# Patient Record
Sex: FEMALE | Race: WHITE | NOT HISPANIC OR LATINO | Employment: OTHER | URBAN - NONMETROPOLITAN AREA
[De-identification: names, ages, dates, MRNs, and addresses within clinical notes are randomized per-mention and may not be internally consistent; named-entity substitution may affect disease eponyms.]

---

## 2017-09-12 ENCOUNTER — OFFICE VISIT (OUTPATIENT)
Dept: ORTHOPEDIC SURGERY | Facility: CLINIC | Age: 62
End: 2017-09-12

## 2017-09-12 VITALS — BODY MASS INDEX: 23.49 KG/M2 | WEIGHT: 155 LBS | HEIGHT: 68 IN

## 2017-09-12 DIAGNOSIS — M17.11 PRIMARY OSTEOARTHRITIS OF RIGHT KNEE: ICD-10-CM

## 2017-09-12 DIAGNOSIS — M25.561 RIGHT KNEE PAIN, UNSPECIFIED CHRONICITY: Primary | ICD-10-CM

## 2017-09-12 DIAGNOSIS — Z96.652 STATUS POST TOTAL KNEE REPLACEMENT, LEFT: ICD-10-CM

## 2017-09-12 PROCEDURE — 73560 X-RAY EXAM OF KNEE 1 OR 2: CPT | Performed by: ORTHOPAEDIC SURGERY

## 2017-09-12 PROCEDURE — 99204 OFFICE O/P NEW MOD 45 MIN: CPT | Performed by: ORTHOPAEDIC SURGERY

## 2017-09-12 RX ORDER — MELOXICAM 7.5 MG/1
7.5 TABLET ORAL DAILY
COMMUNITY

## 2017-09-12 RX ORDER — ESTRADIOL 1 MG/1
1 TABLET ORAL DAILY
COMMUNITY
End: 2023-02-24

## 2017-09-12 RX ORDER — GABAPENTIN 100 MG/1
100 CAPSULE ORAL 3 TIMES DAILY
COMMUNITY

## 2017-09-12 RX ORDER — LOVASTATIN 20 MG/1
20 TABLET ORAL NIGHTLY
COMMUNITY

## 2017-09-12 NOTE — PROGRESS NOTES
Chief Complaint   Patient presents with   • Right Knee - Pain   Patient is here today with right knee pain. She has had the left knee replaced and revised back in 2015.        HPI patient is here for second opinion.  She has progressively increasing pain and discomfort on the lateral aspect the right knee.  Duration of complaints is about 4 years.  She is progressively losing mobility.  She also feels like her valgus deformities progressively worse.  For the past 2 months her symptoms have been increasingly intense.  She describes her pain as a dull aching pain on the lateral aspect of the knee.  She has difficulty going up and down the steps.  Intermittently she will get a sharp stabbing pain as well.  There is associated clicking popping and grinding on the lateral aspect of the knee as well.  There is also a tendency for her patella to ride laterally.  Unfortunately the patient had a total knee arthroplasty on the opposite side performed by Dr. Roland Andrews in [facility] in June 2015.  This knee replacement did not do well for her right from the get go.  She continued to have pain and discomfort and swelling.  She then underwent a revision surgery which is a Travesty for a relatively young patient to have had within 1 year off her index surgery.  This surgery was performed by Dr. Akbar Ernandez in [facility].  She still has less than perfect function on the left side.  She is quite content to except there is some optimal outcome with her left knee arthroplasty at this point.  She is dead set against more surgeries of the left side and I certainly agree with that.  The right knee is moderately symptomatic and in light of the fact that she had a less than perfect outcome on the opposite side both the patient and I want to wait for as long as possible before offering her surgical replacement of the right knee.  He works as an  in a school system and [place] and Wants to Be As Active  As Possible to Improve Her General Health Including Improved Cardiovascular Status.  The Knee Pain Does Inhibit Her Mobility.  She Also Notes That the Valgus Deformity Is Somewhat Progressive and She Is Becoming More Knock Kneed Than before As the Arthritis Progresses.          Allergies not on file      Social History     Social History   • Marital status:      Spouse name: N/A   • Number of children: N/A   • Years of education: N/A     Occupational History   • Not on file.     Social History Main Topics   • Smoking status: Not on file   • Smokeless tobacco: Not on file   • Alcohol use Not on file   • Drug use: Not on file   • Sexual activity: Not on file     Other Topics Concern   • Not on file     Social History Narrative       No family history on file.    No past surgical history on file.    No past medical history on file.        There were no vitals filed for this visit.          Review of Systems   Constitutional: Negative.    HENT: Negative.    Eyes: Negative.    Respiratory: Negative.    Cardiovascular: Negative.    Gastrointestinal: Negative.    Endocrine: Negative.    Genitourinary: Negative.    Musculoskeletal: Negative.    Skin: Negative.    Allergic/Immunologic: Negative.    Neurological: Negative.    Hematological: Negative.    Psychiatric/Behavioral: Negative.            Physical Exam   Constitutional: She is oriented to person, place, and time. She appears well-nourished.   HENT:   Head: Atraumatic.   Eyes: EOM are normal.   Neck: Neck supple.   Cardiovascular: Normal rate and intact distal pulses.    Pulmonary/Chest: Breath sounds normal.   Abdominal: Bowel sounds are normal.   Musculoskeletal: She exhibits edema and tenderness.   Neurological: She is alert and oriented to person, place, and time. She has normal reflexes.   Skin: Skin is dry.   Psychiatric: She has a normal mood and affect. Her behavior is normal. Judgment and thought content normal.   Nursing note and vitals  reviewed.              Joint/Body Part Specific Exam:  right knee. Positive grind test. Pain and tenderness is present over the lateral aspect of the knee. Patient has some tenderness and irritability of the common peroneal nerve. Lateral joint line is exquisitely painful and tender. Patella is tending to track a little bit laterally. There is thickening of the synovial membrane. Range of motion in flexion is 0-120° degrees. Dorsalis pedis and posterior tibial artery pulses are palpable. Common peroneal nerve function is well preserved. Gait is cautious and antalgic. The patient has valgus orientation of the knee with a high degree of external rotation than the contralateral side.    left knee.The patient is status post total knee arthroplasty postoperative 2 year(s). Incision is clean. Calf is soft and nontender. Homans sign is negative. There is no clicking, popping or catching. Anterior and posterior drawer signs are negative.  There is no instability of the components. Appropriate amounts of swelling and bruising are noted. Dorsalis pedis and posterior tibial artery pulses are palpable. Common peroneal nerve function is well preserved. Range of motion is from 0- 110 degrees of flexion. Gait is cautious but otherwise fairly normal. There is no evidence of a deep seated joint infection.    X-ray report:  right Knee X-Ray  Indication: Pain with progressive valgus deformity  AP, Lateral views  Findings: Moderately advanced degenerative osteoarthritis of the right knee  no bony lesion  Soft tissues within normal limits  decreased joint spaces  Hardware appropriately positioned not applicable      no prior studies available for comparison.    X-RAY was ordered and reviewed by Keon Luna MD  Diagnostics:            Rosaura was seen today for pain.    Diagnoses and all orders for this visit:    Right knee pain, unspecified chronicity  -     XR Knee 1 or 2 View Right            Procedures          I provided this  patient with educational materials regarding bone health and cast or splint care.        Plan:   Extensive amount of time spent with the patient and her family today in the office discussing different treatment options.    At this point I would recommend conservative nonoperative care of the right knee.    Injection Synvisc for Visco supplementation or injections steroid intra-articularly for helping with pain and inflammation discussed and offered to the patient.    Use a supportive,  brace discussed with the patient.    Calcium and vitamin D for bone health.    Glucosamine and chondroitin for cartilage health.    Tablet ibuprofen 600 mg orally daily at bedtime.  Pain and discomfort.    Home-based exercise program with stretching sensing exercises of the quads and the hamstrings.    6 monthly follow-ups to see the progression of the valgus degenerative osteoarthritis of the knee.    At this point she does not qualify to be a candidate for total knee arthroplasty because of a lack of her symptoms although she does have quite a significant amount of radiological disease with a valgus deformity.  In light of the fact that she is already had to have a revision on the left knee arthroplasty I would recommend conservative, nonoperative care should be absolutely a last resort for this patient.    Time spent in the office with the patient and her friend discussing different treatment options and reviewing the notes from the previous doctors is 45 minutes.    CC To ISRRAEL Muñoz

## 2017-09-19 PROBLEM — M25.561 RIGHT KNEE PAIN: Status: ACTIVE | Noted: 2017-09-19

## 2017-09-19 PROBLEM — M17.11 PRIMARY OSTEOARTHRITIS OF RIGHT KNEE: Status: ACTIVE | Noted: 2017-09-19

## 2017-09-19 PROBLEM — Z96.652 STATUS POST TOTAL KNEE REPLACEMENT, LEFT: Status: ACTIVE | Noted: 2017-09-19

## 2018-03-13 ENCOUNTER — OFFICE VISIT (OUTPATIENT)
Dept: ORTHOPEDIC SURGERY | Facility: CLINIC | Age: 63
End: 2018-03-13

## 2018-03-13 DIAGNOSIS — M17.11 PRIMARY OSTEOARTHRITIS OF RIGHT KNEE: Primary | ICD-10-CM

## 2018-03-13 DIAGNOSIS — Z96.652 STATUS POST TOTAL KNEE REPLACEMENT, LEFT: ICD-10-CM

## 2018-03-13 PROCEDURE — 99213 OFFICE O/P EST LOW 20 MIN: CPT | Performed by: ORTHOPAEDIC SURGERY

## 2018-03-13 NOTE — PROGRESS NOTES
Chief Complaint   Patient presents with   • Right Knee - Follow-up           HPI  Patient is here for a follow up of her right knee. She has medial sided pain and discomfort.  There is some difficulty in going up and down the steps.  The patient finds it difficult to squat on the ground because of the medial pain and discomfort.  She states that she would like to defer surgical intervention in the form of a knee arthroplasty for as long as possible because she had a relatively bad outcome with her left knee replacement.  Initially she had to have a knee arthroscopy performed by Dr. Noel in 2015.  This was followed by a total knee replacement performed by Dr. Cevallos.  This led to an unsatisfactory outcome and she has already had a revision performed with Dr. Ernandez.  She states that the unpleasant experience is still fresh and undermined and she would like to avoid surgery as long as possible.  I certainly agree with that approach.        There were no vitals filed for this visit.        Review of Systems   Constitutional: Negative.    HENT: Negative.    Eyes: Negative.    Respiratory: Negative.    Cardiovascular: Negative.    Gastrointestinal: Negative.    Endocrine: Negative.    Genitourinary: Negative.    Musculoskeletal: Positive for gait problem and joint swelling.   Skin: Negative.    Allergic/Immunologic: Negative.    Hematological: Negative.    Psychiatric/Behavioral: Negative.            Physical Exam   Constitutional: She is oriented to person, place, and time. She appears well-nourished.   HENT:   Head: Atraumatic.   Eyes: EOM are normal.   Neck: Neck supple.   Cardiovascular: Normal rate, regular rhythm, normal heart sounds and intact distal pulses.    Pulmonary/Chest: Effort normal and breath sounds normal.   Abdominal: Bowel sounds are normal.   Musculoskeletal: She exhibits edema and tenderness.   Neurological: She is alert and oriented to person, place, and time. She has normal reflexes.   Skin: Skin  is dry. Capillary refill takes 2 to 3 seconds.   Psychiatric: She has a normal mood and affect. Her behavior is normal. Judgment and thought content normal.   Nursing note and vitals reviewed.          Joint/Body Part Specific Exam:  right knee. Patient has crepitus throughout range of motion. Positive patellar grind test. Mild effusion. Lachman is negative. Pivot shift is negative. Anterior and posterior drawer signs are negative. Significant joint line tenderness is noted on the medial aspect of the knee. Patient has a varus orientation of the knee. There is fullness and tenderness in the Popliteal fossa. Mild distention of a Popliteal cyst is noted in this location. Range of motion in flexion is from 0- 110° degrees. Neurovascular status is intact.  Dorsalis pedis and posterior tibial artery pulses are palpable. Common peroneal nerve function is well preserved. Patient's gait is cautious and antalgic. Skin and soft tissues are mildly swollen, consistent with synovitis and effusion. The patient has a significant limp with the first few steps after starting the gait cycle. Getting out of a chair takes a lot of effort due to pain on knee flexion.    left knee.The patient is status post total knee arthroplasty postoperative 3 year(s). Incision is clean. Calf is soft and nontender. Homans sign is negative. There is no clicking, popping or catching. Anterior and posterior drawer signs are negative.  There is no instability of the components. Appropriate amounts of swelling and bruising are noted. Dorsalis pedis and posterior tibial artery pulses are palpable. Common peroneal nerve function is well preserved. Range of motion is from 10- 100 degrees of flexion. Gait is cautious but otherwise fairly normal. There is no evidence of a deep seated joint infection.  X-RAY Report:  Prior x-rays obtained in this office in September 2017 show some narrowing of the medial joint space with osteophyte  formation.      Diagnostics:        Rosaura was seen today for follow-up.    Diagnoses and all orders for this visit:    Primary osteoarthritis of right knee    Status post total knee replacement, left            Procedures        Plan:  Gentle active mobilization of both knees with stretching and strengthening exercises of the quads and the hamstrings.    Tablet ibuprofen 600 mg orally twice a day for pain swelling and discomfort.    Calcium and vitamin D for bone health.    Use a supportive brace on the knee to prevent it from buckling and giving out.    Falls precautions.    Intra-articular steroid injection and Synvisc Visco supplementation discussed with the patient at length.    Eventually she will need a total knee arthroplasty of the right knee as well.    , GI and dental procedure prophylaxis with antibiotics to prevent a metastatic infection to the knee arthroplasty implants.    Follow-up in my office in 1 year.  The patient can come in sooner if she feels like her symptoms are getting worse and she requires intervention either in the form of an injection or consideration for surgery.

## 2018-10-30 ENCOUNTER — OFFICE VISIT CONVERTED (OUTPATIENT)
Dept: ORTHOPEDIC SURGERY | Facility: CLINIC | Age: 63
End: 2018-10-30
Attending: ORTHOPAEDIC SURGERY

## 2018-12-11 ENCOUNTER — OFFICE VISIT CONVERTED (OUTPATIENT)
Dept: ORTHOPEDIC SURGERY | Facility: CLINIC | Age: 63
End: 2018-12-11
Attending: ORTHOPAEDIC SURGERY

## 2018-12-11 ENCOUNTER — CONVERSION ENCOUNTER (OUTPATIENT)
Dept: ORTHOPEDIC SURGERY | Facility: CLINIC | Age: 63
End: 2018-12-11

## 2019-02-05 ENCOUNTER — OFFICE VISIT CONVERTED (OUTPATIENT)
Dept: ORTHOPEDIC SURGERY | Facility: CLINIC | Age: 64
End: 2019-02-05
Attending: PHYSICIAN ASSISTANT

## 2019-04-04 ENCOUNTER — OFFICE VISIT CONVERTED (OUTPATIENT)
Dept: ORTHOPEDIC SURGERY | Facility: CLINIC | Age: 64
End: 2019-04-04
Attending: PHYSICIAN ASSISTANT

## 2019-05-16 ENCOUNTER — OFFICE VISIT CONVERTED (OUTPATIENT)
Dept: ORTHOPEDIC SURGERY | Facility: CLINIC | Age: 64
End: 2019-05-16
Attending: PHYSICIAN ASSISTANT

## 2019-06-04 ENCOUNTER — OFFICE VISIT CONVERTED (OUTPATIENT)
Dept: ORTHOPEDIC SURGERY | Facility: CLINIC | Age: 64
End: 2019-06-04
Attending: PHYSICIAN ASSISTANT

## 2019-06-19 ENCOUNTER — OFFICE VISIT CONVERTED (OUTPATIENT)
Dept: GASTROENTEROLOGY | Facility: CLINIC | Age: 64
End: 2019-06-19
Attending: INTERNAL MEDICINE

## 2019-07-09 ENCOUNTER — OFFICE VISIT CONVERTED (OUTPATIENT)
Dept: ORTHOPEDIC SURGERY | Facility: CLINIC | Age: 64
End: 2019-07-09
Attending: PHYSICIAN ASSISTANT

## 2019-07-09 ENCOUNTER — CONVERSION ENCOUNTER (OUTPATIENT)
Dept: ORTHOPEDIC SURGERY | Facility: CLINIC | Age: 64
End: 2019-07-09

## 2019-07-11 ENCOUNTER — HOSPITAL ENCOUNTER (OUTPATIENT)
Dept: GASTROENTEROLOGY | Facility: HOSPITAL | Age: 64
Setting detail: HOSPITAL OUTPATIENT SURGERY
Discharge: HOME | End: 2019-07-11
Attending: INTERNAL MEDICINE

## 2019-07-11 LAB — GLUCOSE BLD-MCNC: 106 MG/DL (ref 65–99)

## 2019-07-25 ENCOUNTER — OFFICE VISIT CONVERTED (OUTPATIENT)
Dept: ORTHOPEDIC SURGERY | Facility: CLINIC | Age: 64
End: 2019-07-25
Attending: PHYSICIAN ASSISTANT

## 2019-07-25 ENCOUNTER — CONVERSION ENCOUNTER (OUTPATIENT)
Dept: ORTHOPEDIC SURGERY | Facility: CLINIC | Age: 64
End: 2019-07-25

## 2019-09-05 ENCOUNTER — OFFICE VISIT CONVERTED (OUTPATIENT)
Dept: ORTHOPEDIC SURGERY | Facility: CLINIC | Age: 64
End: 2019-09-05
Attending: ORTHOPAEDIC SURGERY

## 2019-11-06 ENCOUNTER — HOSPITAL ENCOUNTER (OUTPATIENT)
Dept: PERIOP | Facility: HOSPITAL | Age: 64
Setting detail: HOSPITAL OUTPATIENT SURGERY
Discharge: HOME | End: 2019-11-06
Attending: ORTHOPAEDIC SURGERY

## 2019-11-06 LAB
ANION GAP SERPL CALC-SCNC: 17 MMOL/L (ref 8–19)
BUN SERPL-MCNC: 12 MG/DL (ref 5–25)
BUN/CREAT SERPL: 15 {RATIO} (ref 6–20)
CALCIUM SERPL-MCNC: 9.6 MG/DL (ref 8.7–10.4)
CHLORIDE SERPL-SCNC: 104 MMOL/L (ref 99–111)
CONV CO2: 24 MMOL/L (ref 22–32)
CREAT UR-MCNC: 0.8 MG/DL (ref 0.5–0.9)
GFR SERPLBLD BASED ON 1.73 SQ M-ARVRAT: >60 ML/MIN/{1.73_M2}
GLUCOSE SERPL-MCNC: 99 MG/DL (ref 65–99)
OSMOLALITY SERPL CALC.SUM OF ELEC: 292 MOSM/KG (ref 273–304)
POTASSIUM SERPL-SCNC: 4.1 MMOL/L (ref 3.5–5.3)
SODIUM SERPL-SCNC: 141 MMOL/L (ref 135–147)

## 2019-11-21 ENCOUNTER — OFFICE VISIT CONVERTED (OUTPATIENT)
Dept: ORTHOPEDIC SURGERY | Facility: CLINIC | Age: 64
End: 2019-11-21
Attending: PHYSICIAN ASSISTANT

## 2019-12-19 ENCOUNTER — OFFICE VISIT CONVERTED (OUTPATIENT)
Dept: ORTHOPEDIC SURGERY | Facility: CLINIC | Age: 64
End: 2019-12-19
Attending: PHYSICIAN ASSISTANT

## 2020-01-30 ENCOUNTER — OFFICE VISIT CONVERTED (OUTPATIENT)
Dept: ORTHOPEDIC SURGERY | Facility: CLINIC | Age: 65
End: 2020-01-30
Attending: ORTHOPAEDIC SURGERY

## 2020-04-23 ENCOUNTER — OFFICE VISIT CONVERTED (OUTPATIENT)
Dept: ORTHOPEDIC SURGERY | Facility: CLINIC | Age: 65
End: 2020-04-23
Attending: PHYSICIAN ASSISTANT

## 2020-05-28 ENCOUNTER — OFFICE VISIT CONVERTED (OUTPATIENT)
Dept: ORTHOPEDIC SURGERY | Facility: CLINIC | Age: 65
End: 2020-05-28
Attending: PHYSICIAN ASSISTANT

## 2020-07-14 ENCOUNTER — OFFICE VISIT CONVERTED (OUTPATIENT)
Dept: ORTHOPEDIC SURGERY | Facility: CLINIC | Age: 65
End: 2020-07-14
Attending: PHYSICIAN ASSISTANT

## 2021-04-06 ENCOUNTER — OFFICE VISIT CONVERTED (OUTPATIENT)
Dept: GASTROENTEROLOGY | Facility: CLINIC | Age: 66
End: 2021-04-06
Attending: NURSE PRACTITIONER

## 2021-05-12 NOTE — PROGRESS NOTES
Progress Note      Patient Name: Rosaura Perry   Patient ID: [id number]   Sex: Female   YOB: 1955    Primary Care Provider: Roma ALVARADO   Referring Provider: Hanh Nazario MD    Visit Date: April 23, 2020    Provider: Hawa Meza PA-C   Location: [facility]   Location Address: [address] [place] [address]  [id number]   Location Phone: [phone]          Chief Complaint  · Left Shoulder Pain      History Of Present Illness  Rosaura Perry is a 64 year old /White female who presents today to [facility].      She is here for left shoulder pain. She had been doing well since her left arthroscopic SAD/DC/RCR 11/6/19 by Dr. Nazario until 3 weeks ago. She states deep pain that feels like prior to her surgery. She denies re-injury. She states lack of ROM.       Past Medical History  Arthritis; Bowel Disease; Cervicalgia; Diabetes; Diverticulitis; Diverticulitis; Essential hypertension; Headache; High cholesterol; Hyperlipemia; Hypertension; Hypertension, essential; Irritable bowel syndrome; Limb Swelling; Neck pain; Seasonal allergies; Sprain And Strain         Past Surgical History  Appendectomy; Bunion surgery; Colonoscopy; EGD; Hysterectomy; Joint Surgery; Knee surgery; Metal implants; Shoulder surgery         Medication List  estradiol 0.5 mg oral tablet; estradiol 1 mg Oral Tablet; Flexeril 5 mg Oral tablet; Jardiance 10 mg oral tablet; labetalol 100 mg Oral Tablet; losartan 25 mg oral tablet; lovastatin 20 mg oral tablet; Medrol (Caden) 4 mg oral tablets,dose pack; meloxicam 7.5 mg oral tablet; metoprolol tartrate 50 mg oral tablet; Neurontin 100 mg Oral capsule; Norco 7.5-325 mg oral tablet; pravastatin 20 mg Oral tablet; Suprep Bowel Prep Kit 17.5-3.13-1.6 gram oral recon soln; Viberzi 75 mg oral tablet         Allergy List  NO KNOWN DRUG ALLERGIES         Family Medical History  Diabetes, unspecified type; Diabetes, unspecified; - No Family History of  "Colorectal Cancer; -None; Family history of certain chronic disabling diseases; arthritis; Osteoporosis         Social History  Alcohol (Never); Alcohol Use (Never); lives with spouse; ; .; Recreational Drug Use (Never); Retired; Tobacco (Never); Working         Review of Systems  · Constitutional  o Denies  o : fever, chills, weight loss  · Cardiovascular  o Denies  o : chest pain, shortness of breath  · Gastrointestinal  o Denies  o : liver disease, heartburn, nausea, blood in stools  · Genitourinary  o Denies  o : painful urination, blood in urine  · Integument  o Denies  o : rash, itching  · Neurologic  o Denies  o : headache, weakness, loss of consciousness  · Musculoskeletal  o Admits  o : painful, swollen joints  · Psychiatric  o Denies  o : drug/alcohol addiction, anxiety, depression      Vitals  Date Time BP Position Site L\R Cuff Size HR RR TEMP (F) WT  HT  BMI kg/m2 BSA m2 O2 Sat        04/23/2020 10:35 AM      58 - R   147lbs 16oz 5'  8\" 22.5 1.79 99 %          Physical Examination  · Constitutional  o Appearance  o : well developed, well-nourished, no obvious deformities present  · Head and Face  o Head  o :   § Inspection  § : normocephalic  o Face  o :   § Inspection  § : no facial lesions  · Eyes  o Conjunctivae  o : conjunctivae normal  o Sclerae  o : sclerae white  · Ears, Nose, Mouth and Throat  o Ears  o :   § External Ears  § : appearance within normal limits  § Hearing  § : intact  o Nose  o :   § External Nose  § : appearance normal  · Neck  o Inspection/Palpation  o : normal appearance  o Range of Motion  o : full range of motion  · Respiratory  o Respiratory Effort  o : breathing unlabored  o Inspection of Chest  o : normal appearance  o Auscultation of Lungs  o : no audible wheezing or rales  · Cardiovascular  o Heart  o : regular rate  · Gastrointestinal  o Abdominal Examination  o : soft and non-tender  · Skin and Subcutaneous Tissue  o General Inspection  o : intact, no " rashes  · Psychiatric  o General  o : Alert and oriented x3  o Judgement and Insight  o : judgment and insight intact  o Mood and Affect  o : mood normal, affect appropriate  · Left Shoulder  o Inspection  o : Well healed incisions. Near full PROM. Pain with AROM. Strength decreased. Neurovascularly intact.   · Injection Note/Aspiration Note  o Site  o : IM  o Procedure  o : Procedure: After educating the patient, patient gave consent for the procedure. After using alcohol prep, injection was given. The patient tolerated the procedure well.   o Medication  o : 2ml's of 4 mg Dexamethasone   · In Office Procedures  o View  o : AP/LATERAL  o Site  o : left, shoulder   o Indication  o : Left shoulder pain   o Study  o : X-rays ordered, taken in the office, and reviewed today.  o Xray  o : No acute bony abnormality.  o Comparative Data  o : Comparative Data found and reviewed today               Assessment  · Left shoulder pain, unspecified chronicity     719.41/M25.512  · History of repair of left rotator cuff     V15.29/Z98.890      Plan  · Orders  o 2.00 - Dexamethasone Injection 8mg (-1) - - 04/24/2020   Lot [id number] Exp 02 2021 [facility] by FORREST Christianson RT R  o IM/SQ - Injection Fee [facility] ([id number]) - - 04/24/2020  o Scapula (Left) [facility] Preferred View ([id number]) - 719.41/M25.512 - 04/23/2020  · Medications  o Medications have been Reconciled  o Transition of Care or Provider Policy  · Instructions  o Reviewed the patient's Past Medical, Social, and Family history as well as the ROS at today's visit, no changes.  o Call or return if worsening symptoms.  o IM injection. Follow up 4 weeks.   o Electronically Identified Patient Education Materials Provided Electronically            Electronically Signed by: LA Shah-C -Author on April 24, 2020 01:16:45 PM  Electronically Co-signed by: Hanh Nazario MD -Reviewer on April 28, 2020 10:49:21 AM

## 2021-05-13 NOTE — PROGRESS NOTES
"   Progress Note      Patient Name: Rosaura Perry   Patient ID: [id number]   Sex: Female   YOB: 1955    Primary Care Provider: Roma ALVARADO    Visit Date: July 14, 2020    Provider: Hawa Meza PA-C   Location: [facility]   Location Address: [address] [place] [address]  [id number]   Location Phone: [phone]          Chief Complaint  · Follow up left shoulder pain      History Of Present Illness  Rosaura Perry is a 64 year old /White female who presents today to [facility].      She is here for follow up for left shoulder pain. She is s/p left arthroscopic SAD/DC/RCR 11/6/19 by Dr. Nazario. She states IM injection gave short lived relief. She is back to baseline. She states pain lying on her left side and difficulty with ROM. She had MRI of left shoulder, which ruled out recurrent tear.             Review of Systems  · Constitutional  o Denies  o : fever, chills, weight loss  · Cardiovascular  o Denies  o : chest pain, shortness of breath  · Gastrointestinal  o Denies  o : liver disease, heartburn, nausea, blood in stools  · Genitourinary  o Denies  o : painful urination, blood in urine  · Integument  o Denies  o : rash, itching  · Neurologic  o Denies  o : headache, weakness, loss of consciousness  · Musculoskeletal  o Denies  o : painful, swollen joints  · Psychiatric  o Denies  o : drug/alcohol addiction, anxiety, depression      Vitals  Date Time BP Position Site L\R Cuff Size HR RR TEMP (F) WT  HT  BMI kg/m2 BSA m2 O2 Sat        07/14/2020 03:37 PM      60 - R   146lbs 16oz 5'  8\" 22.35 1.79 97 %          Physical Examination  · Constitutional  o Appearance  o : well developed, well-nourished, no obvious deformities present  · Head and Face  o Head  o :   § Inspection  § : normocephalic  o Face  o :   § Inspection  § : no facial lesions  · Eyes  o Conjunctivae  o : conjunctivae normal  o Sclerae  o : sclerae white  · Ears, Nose, Mouth and " Throat  o Ears  o :   § External Ears  § : appearance within normal limits  § Hearing  § : intact  o Nose  o :   § External Nose  § : appearance normal  · Neck  o Inspection/Palpation  o : normal appearance  o Range of Motion  o : full range of motion  · Respiratory  o Respiratory Effort  o : breathing unlabored  o Inspection of Chest  o : normal appearance  o Auscultation of Lungs  o : no audible wheezing or rales  · Cardiovascular  o Heart  o : regular rate  · Gastrointestinal  o Abdominal Examination  o : soft and non-tender  · Skin and Subcutaneous Tissue  o General Inspection  o : intact, no rashes  · Psychiatric  o General  o : Alert and oriented x3  o Judgement and Insight  o : judgment and insight intact  o Mood and Affect  o : mood normal, affect appropriate  · Left Shoulder  o Inspection  o : Well healed incisions. Full PROM. Pain at extremes. Decreased strength. Neurovascularly intact.   · Imaging  o Imaging  o : MRI 7/6/20 of left shoulder: IMPRESSION: No convincing evidence of recurrent rotator cuff tear. Attenuation of the supraspinatus tendon with edematous changes compatible with acute on chronic tendinosis and presents sequela of previous cuff tear and repair. Moderate diffuse infraspinatus tendinosis with high signal within the substance of the tendon may represent a delaminating tear but no communication to the articular bursal surface this may just reflect severe tendinosis. AC joint arthropathy. Os acromiale with some degeneration across the synchondrosis. Mild subacromial subdeltoid bursal inflammation. Mild biceps tendinosis.          Assessment  · Pain: Shoulder     719.41/M25.519  · History of repair of left rotator cuff     V15.29/Z98.890      Plan  · Medications  o Medications have been Reconciled  o Transition of Care or Provider Policy  · Instructions  o Reviewed the patient's Past Medical, Social, and Family history as well as the ROS at today's visit, no changes.  o Call or return if  worsening symptoms.  o She declines injection. We will try Medrol dosepack and continued HEP. Follow up PRN. May consider decadron injection.   o Electronically Identified Patient Education Materials Provided Electronically            Electronically Signed by: Hawa Meza PA-C -Author on July 14, 2020 04:06:27 PM

## 2021-05-13 NOTE — PROGRESS NOTES
Progress Note      Patient Name: Rosaura Perry   Patient ID: [id number]   Sex: Female   YOB: 1955    Primary Care Provider: Roma ALVARADO    Visit Date: May 28, 2020    Provider: Hawa Meza PA-C   Location: [facility]   Location Address: [address] [place] [address]  [id number]   Location Phone: [phone]          Chief Complaint  · Follow up left shoulder arthroscopy      History Of Present Illness  Rosaura Perry is a 64 year old /White female who presents today to [facility].      She is here for follow up for left shoulder pain. She is s/p left arthroscopic SAD/DC/RCR 11/6/19 by Dr. Nazario. She states IM injection gave short lived relief. She is back to baseline. She states pain lying on her left side and difficulty with ROM.       Past Medical History  Arthritis; Bowel Disease; Cervicalgia; Diabetes; Diverticulitis; Diverticulitis; Essential hypertension; Headache; High cholesterol; Hyperlipemia; Hypertension; Hypertension, essential; Irritable bowel syndrome; Limb Swelling; Neck pain; Seasonal allergies; Sprain And Strain         Past Surgical History  Appendectomy; Bunion surgery; Colonoscopy; EGD; Hysterectomy; Joint Surgery; Knee surgery; Metal implants; Shoulder surgery         Medication List  estradiol 0.5 mg oral tablet; estradiol 1 mg Oral Tablet; Flexeril 5 mg Oral tablet; Jardiance 10 mg oral tablet; labetalol 100 mg Oral Tablet; losartan 25 mg oral tablet; lovastatin 20 mg oral tablet; Medrol (Caden) 4 mg oral tablets,dose pack; meloxicam 7.5 mg oral tablet; metoprolol tartrate 50 mg oral tablet; Neurontin 100 mg Oral capsule; Norco 7.5-325 mg oral tablet; pravastatin 20 mg Oral tablet; Suprep Bowel Prep Kit 17.5-3.13-1.6 gram oral recon soln; Viberzi 75 mg oral tablet         Allergy List  NO KNOWN DRUG ALLERGIES       Allergies Reconciled  Family Medical History  Diabetes, unspecified type; Diabetes, unspecified; - No Family History of  "Colorectal Cancer; -None; Family history of certain chronic disabling diseases; arthritis; Osteoporosis         Social History  Alcohol (Never); Alcohol Use (Never); lives with spouse; ; .; Recreational Drug Use (Never); Retired; Tobacco (Never); Working         Review of Systems  · Constitutional  o Denies  o : fever, chills, weight loss  · Cardiovascular  o Denies  o : chest pain, shortness of breath  · Gastrointestinal  o Denies  o : liver disease, heartburn, nausea, blood in stools  · Genitourinary  o Denies  o : painful urination, blood in urine  · Integument  o Denies  o : rash, itching  · Neurologic  o Denies  o : headache, weakness, loss of consciousness  · Musculoskeletal  o Admits  o : painful, swollen joints  · Psychiatric  o Denies  o : drug/alcohol addiction, anxiety, depression      Vitals  Date Time BP Position Site L\R Cuff Size HR RR TEMP (F) WT  HT  BMI kg/m2 BSA m2 O2 Sat        05/28/2020 01:53 PM      51 - R   146lbs 16oz 5'  8\" 22.35 1.79 98 %          Physical Examination  · Constitutional  o Appearance  o : well developed, well-nourished, no obvious deformities present  · Head and Face  o Head  o :   § Inspection  § : normocephalic  o Face  o :   § Inspection  § : no facial lesions  · Eyes  o Conjunctivae  o : conjunctivae normal  o Sclerae  o : sclerae white  · Ears, Nose, Mouth and Throat  o Ears  o :   § External Ears  § : appearance within normal limits  § Hearing  § : intact  o Nose  o :   § External Nose  § : appearance normal  · Neck  o Inspection/Palpation  o : normal appearance  o Range of Motion  o : full range of motion  · Respiratory  o Respiratory Effort  o : breathing unlabored  o Inspection of Chest  o : normal appearance  o Auscultation of Lungs  o : no audible wheezing or rales  · Cardiovascular  o Heart  o : regular rate  · Gastrointestinal  o Abdominal Examination  o : soft and non-tender  · Skin and Subcutaneous Tissue  o General Inspection  o : intact, no " rashes  · Psychiatric  o General  o : Alert and oriented x3  o Judgement and Insight  o : judgment and insight intact  o Mood and Affect  o : mood normal, affect appropriate  · Left Shoulder  o Inspection  o : Well healed scars. Near full PROM, pain at extremes. Weakness with empty can test. Strength 3+/5 with MMT. Neurovascularly intact.           Assessment  · Pain: Shoulder     719.41/M25.519  · History of repair of rotator cuff     V15.29/Z98.890      Plan  · Medications  o Medications have been Reconciled  o Transition of Care or Provider Policy  · Instructions  o Reviewed the patient's Past Medical, Social, and Family history as well as the ROS at today's visit, no changes.  o Call or return if worsening symptoms.  o I am suspicious of recurrent tear. Obtain MRI left shoulder.   o Electronically Identified Patient Education Materials Provided Electronically            Electronically Signed by: Hawa Meza PA-C -Author on May 28, 2020 02:23:21 PM

## 2021-05-14 VITALS
HEIGHT: 68 IN | SYSTOLIC BLOOD PRESSURE: 105 MMHG | DIASTOLIC BLOOD PRESSURE: 48 MMHG | WEIGHT: 143 LBS | RESPIRATION RATE: 16 BRPM | OXYGEN SATURATION: 99 % | BODY MASS INDEX: 21.67 KG/M2 | HEART RATE: 52 BPM

## 2021-05-14 NOTE — PROGRESS NOTES
Progress Note      Patient Name: Rosaura Perry   Patient ID: [id number]   Sex: Female   YOB: 1955    Referring Provider: HANNAH HARRIS    Visit Date: April 6, 2021    Provider: ISRRAEL CHOUDHURY   Location: [facility]   Location Address: [address] [place] [address]  [id number]   Location Phone: [phone]          Chief Complaint  · Follow-up     Predominantly diarrhea irritable bowel syndrome follow-up       History Of Present Illness     65-year-old female presented to the office today for a follow-up with a history of predominant diarrhea irritable bowel syndrome.  Patient was previously on Viberzi for her diarrhea but she has changed insurance and is unable to get this medication.  Patient reports using essential oils, Imodium and making sure she chooses good diet choices related to her diverticulosis and IBS.  Patient's insurance company suggested dicyclomine as an alternative to her Viberzi.  Patient has also been taking and IBS clear supplement that she purchased has a dietary supplement and she feels this is helping her to have good bowel movements that has essential oils, vitamin D and fiber within the supplement.  Patient reports she is having 3-5 bowel movements a day and uses Imodium occasionally.  She denies any weight loss, fever, melena, hematochezia, night sweats, nausea, vomiting, constipation, abdominal pain.    Endoscopy: Reviewed the patient's most recent colonoscopy and EGD performed 7/11/2019 by Dr. Carroll revealed a small hiatal hernia normal mucosa throughout the esophagus stomach and duodenum.  The colon revealed a few diverticula in the sigmoid colon, grade 1 internal hemorrhoids and normal mucosa throughout.       Past Medical History  Disease Name Date Onset Notes   Arthritis --  --    Bowel Disease --  --    Cervicalgia 10/04/2013 --    Diabetes --  --    Diverticulitis --  --    Diverticulitis --  --    Essential hypertension --   --    Headache --  --    High cholesterol --  --    Hyperlipemia --  --    Hypertension --  --    Hypertension, essential --  --    Irritable bowel syndrome --  --    Limb Swelling --  --    Neck pain --  --    Seasonal allergies --  --    Sprain And Strain --  --          Past Surgical History  Procedure Name Date Notes   Appendectomy --  --    Bunion surgery --  --    Colonoscopy 2019 --    EGD 2019 --    Hysterectomy --  --    Joint Surgery --  --    Knee surgery --  --    Metal implants --  --    Shoulder surgery --  --          Medication List  Name Date Started Instructions   estradiol 0.5 mg oral tablet  take 1 tablet (0.5 mg) by oral route once daily   Jardiance 10 mg oral tablet  take 1 tablet (10 mg) by oral route once daily in the morning   losartan 25 mg oral tablet  take 1 tablet (25 mg) by oral route once daily   meloxicam 7.5 mg oral tablet  take 1 tablet (7.5 mg) by oral route once daily   metoprolol tartrate 50 mg oral tablet  take 1 tablet (50 mg) by oral route 2 times per day with meals         Allergy List  Allergen Name Date Reaction Notes   NO KNOWN DRUG ALLERGIES --  --  --          Family Medical History  Disease Name Relative/Age Notes   Diabetes, unspecified type Brother/  Daughter/  Father/  Mother/   Mother; Father; Brother; Daughter   Diabetes, unspecified  --    - No Family History of Colorectal Cancer  --    -None  --    Family history of certain chronic disabling diseases; arthritis Father/  Mother/  Sister/   Mother; Father; Sister   Osteoporosis Mother/  Sister/   Mother; Sister         Social History  Finding Status Start/Stop Quantity Notes   Alcohol Use Never --/-- --  does not drink   lives with spouse --  --/-- --  --    . --  --/-- --  --    Recreational Drug Use Never --/-- --  no   Retired --  --/-- --  --    Tobacco Never --/-- --  never smoker   Working --  --/-- --  --          Review of Systems  · Constitutional  o Denies  o : chills, fatigue, fever, loss of  "appetite, night sweats, weakness, weight gain, weight loss  · Cardiovascular  o Denies  o : chest pain  · Respiratory  o Denies  o : shortness of breath  · Gastrointestinal  o Admits  o : diarrhea  o Denies  o : abdominal pain, appetite poor, bloating, blood in stools, bowel changes, constipation, early satiety, heartburn, hematemesis (vomiting blood), hemorrhoids, jaundice, nausea, reflux, rectal bleeding, vomiting, dysphagia  · Endocrine  o Denies  o : weight gain, weight loss      Vitals  Date Time BP Position Site L\R Cuff Size HR RR TEMP (F) WT  HT  BMI kg/m2 BSA m2 O2 Sat FR L/min FiO2 HC       04/06/2021 11:39 /48 Sitting    52 - R 16  143lbs 0oz 5'  8\" 21.74 1.76 99 %            Physical Examination  · Constitutional  o Appearance  o : Healthy-appearing, awake and alert in no acute distress  · Head and Face  o Head  o : Normocephalic with no worriesome skin lesions  · Eyes  o Vision  o :   § Visual Fields  § : eyes move symmetrical in all directions  o Sclerae  o : sclerae anicteric  · Neck  o Inspection/Palpation  o : Trachea is midline, no adenopathy  · Respiratory  o Respiratory Effort  o : Breathing is unlabored.  o Inspection of Chest  o : normal appearance  o Auscultation of Lungs  o : Chest is clear to auscultation bilaterally.  · Cardiovascular  o Heart  o :   § Auscultation of Heart  § : no murmurs, rubs, or gallops, regular rate and rhythm  o Peripheral Vascular System  o :   § Extremities  § : no cyanosis, clubbing or edema;   · Gastrointestinal  o Abdominal Examination  o : Abdomen is soft, nontender to palpation, with normal active bowel sounds, no appreciable hepatosplenomegaly.          Assessment  · Irritable bowel syndrome with diarrhea     564.1/K58.0      Plan  · Orders  o e-prescribe - at least one () - - 04/06/2021  · Medications  o dicyclomine 20 mg oral tablet   SIG: take 1 tablet (20 mg) by oral route 3 times per day as needed for abdominal cramps   DISP: (90) Tablet with 2 " refills  Prescribed on 04/06/2021     o Medications have been Reconciled  o Transition of Care or Provider Policy  · Instructions  o 65-year-old female presenting to the office today for a follow-up related to predominantly diarrhea irritable bowel syndrome. We have reviewed the patient's most recent colonoscopy in 2019. Patient continues to have diarrhea in her medication she was previously on Viberzi is no longer covered on her current insurance. Patient reports they did suggest dicyclomine as an option. I have prescribed dicyclomine 20 mg to be used as needed. Patient is taking IBS clear which is a supplement she is purchasing that has vitamin D, essential oils and fiber within the supplement. We discussed things that could irritate her IBS including stress, sleep and food choices. We will trial dicyclomine and follow-up in 3 months to evaluate her IBS. Patient to call with any questions or concerns in the meantime.  · Disposition  o Call or Return if symptoms worsen or persist.  o Meds sent to pharmacy  o 3 month follow up            Electronically Signed by: ISRRAEL CHOUDHURY -Author on April 6, 2021 12:09:47 PM  Electronically Co-signed by: Roberto Carroll MD -Reviewer on April 12, 2021 07:18:04 PM

## 2021-05-15 VITALS — HEIGHT: 68 IN | HEART RATE: 58 BPM | WEIGHT: 143 LBS | OXYGEN SATURATION: 99 % | BODY MASS INDEX: 21.67 KG/M2

## 2021-05-15 VITALS — OXYGEN SATURATION: 97 % | BODY MASS INDEX: 22.28 KG/M2 | WEIGHT: 147 LBS | HEART RATE: 60 BPM | HEIGHT: 68 IN

## 2021-05-15 VITALS — HEART RATE: 57 BPM | WEIGHT: 142 LBS | HEIGHT: 68 IN | OXYGEN SATURATION: 97 % | BODY MASS INDEX: 21.52 KG/M2

## 2021-05-15 VITALS
OXYGEN SATURATION: 100 % | HEIGHT: 68 IN | BODY MASS INDEX: 22.43 KG/M2 | HEART RATE: 58 BPM | WEIGHT: 148 LBS | SYSTOLIC BLOOD PRESSURE: 111 MMHG | DIASTOLIC BLOOD PRESSURE: 67 MMHG

## 2021-05-15 VITALS — HEIGHT: 68 IN | WEIGHT: 150 LBS | OXYGEN SATURATION: 98 % | HEART RATE: 62 BPM | BODY MASS INDEX: 22.73 KG/M2

## 2021-05-15 VITALS — BODY MASS INDEX: 22.43 KG/M2 | OXYGEN SATURATION: 99 % | WEIGHT: 148 LBS | HEIGHT: 68 IN | HEART RATE: 58 BPM

## 2021-05-15 VITALS — BODY MASS INDEX: 21.67 KG/M2 | HEART RATE: 53 BPM | HEIGHT: 68 IN | OXYGEN SATURATION: 98 % | WEIGHT: 143 LBS

## 2021-05-15 VITALS — BODY MASS INDEX: 22.64 KG/M2 | HEIGHT: 68 IN | WEIGHT: 149.37 LBS | OXYGEN SATURATION: 98 % | HEART RATE: 68 BPM

## 2021-05-15 VITALS — BODY MASS INDEX: 22.43 KG/M2 | OXYGEN SATURATION: 99 % | WEIGHT: 148 LBS | HEART RATE: 57 BPM | HEIGHT: 68 IN

## 2021-05-15 VITALS — OXYGEN SATURATION: 96 % | HEIGHT: 68 IN | HEART RATE: 58 BPM | BODY MASS INDEX: 22.51 KG/M2 | WEIGHT: 148.5 LBS

## 2021-05-15 VITALS — OXYGEN SATURATION: 96 % | HEIGHT: 68 IN | BODY MASS INDEX: 21.52 KG/M2 | HEART RATE: 58 BPM | WEIGHT: 142 LBS

## 2021-05-15 VITALS — WEIGHT: 147 LBS | HEART RATE: 51 BPM | HEIGHT: 68 IN | BODY MASS INDEX: 22.28 KG/M2 | OXYGEN SATURATION: 98 %

## 2021-05-15 VITALS — BODY MASS INDEX: 22.81 KG/M2 | WEIGHT: 150.5 LBS | OXYGEN SATURATION: 93 % | HEART RATE: 58 BPM | HEIGHT: 68 IN

## 2021-05-16 VITALS — WEIGHT: 142.25 LBS | BODY MASS INDEX: 21.56 KG/M2 | OXYGEN SATURATION: 95 % | HEIGHT: 68 IN | HEART RATE: 63 BPM

## 2021-05-16 VITALS — HEIGHT: 68 IN | BODY MASS INDEX: 21.52 KG/M2 | WEIGHT: 142 LBS

## 2021-05-16 VITALS — HEIGHT: 68 IN | WEIGHT: 143.5 LBS | HEART RATE: 74 BPM | BODY MASS INDEX: 21.75 KG/M2 | OXYGEN SATURATION: 98 %

## 2021-08-04 DIAGNOSIS — K58.0 IRRITABLE BOWEL SYNDROME WITH DIARRHEA: Primary | ICD-10-CM

## 2021-08-04 NOTE — TELEPHONE ENCOUNTER
Ms hardy needs a refill on her Dicyclomine 20mg TID #270 to be sent to her [facility]. She has a f/u appt in Sept. thx Kwame

## 2021-08-05 RX ORDER — DICYCLOMINE HCL 20 MG
20 TABLET ORAL 3 TIMES DAILY
Qty: 270 TABLET | Refills: 0 | Status: SHIPPED | OUTPATIENT
Start: 2021-08-05 | End: 2021-09-24 | Stop reason: SDUPTHER

## 2021-09-24 ENCOUNTER — OFFICE VISIT (OUTPATIENT)
Dept: GASTROENTEROLOGY | Facility: CLINIC | Age: 66
End: 2021-09-24

## 2021-09-24 VITALS
BODY MASS INDEX: 21.69 KG/M2 | WEIGHT: 143.1 LBS | HEIGHT: 68 IN | DIASTOLIC BLOOD PRESSURE: 65 MMHG | HEART RATE: 56 BPM | OXYGEN SATURATION: 100 % | SYSTOLIC BLOOD PRESSURE: 119 MMHG

## 2021-09-24 DIAGNOSIS — K58.0 IRRITABLE BOWEL SYNDROME WITH DIARRHEA: Primary | ICD-10-CM

## 2021-09-24 PROCEDURE — 99213 OFFICE O/P EST LOW 20 MIN: CPT | Performed by: NURSE PRACTITIONER

## 2021-09-24 RX ORDER — LOSARTAN POTASSIUM 25 MG/1
25 TABLET ORAL DAILY
COMMUNITY

## 2021-09-24 RX ORDER — ELUXADOLINE 75 MG/1
75 TABLET, FILM COATED ORAL 2 TIMES DAILY
Qty: 60 TABLET | Refills: 5 | Status: SHIPPED | OUTPATIENT
Start: 2021-09-24 | End: 2023-02-24

## 2021-09-24 RX ORDER — METOPROLOL TARTRATE 50 MG/1
50 TABLET, FILM COATED ORAL 2 TIMES DAILY
COMMUNITY

## 2021-09-24 RX ORDER — MELATONIN
2000 DAILY
COMMUNITY

## 2021-09-24 RX ORDER — DICYCLOMINE HCL 20 MG
20 TABLET ORAL 3 TIMES DAILY
Qty: 270 TABLET | Refills: 0 | Status: SHIPPED | OUTPATIENT
Start: 2021-09-24 | End: 2021-12-23

## 2021-09-24 RX ORDER — ELUXADOLINE 75 MG/1
75 TABLET, FILM COATED ORAL 2 TIMES DAILY
Qty: 60 TABLET | Refills: 3 | Status: CANCELLED | OUTPATIENT
Start: 2021-09-24

## 2021-09-24 NOTE — PROGRESS NOTES
Chief Complaint  No chief complaint on file.    History of Present Illness  Rosaura Perry is a 65 y.o. female who presents to [facility] for follow-up     65-year-old female presenting the office today for follow-up with a history of IBS predominant diarrhea.  Patient was previously on Viberzi which is no longer covered by her insurance.  She has been on dicyclomine which is not providing relief.  She reports that this does help with the abdominal cramping but she continues to have loose stools and has had 3-4 accidents since being seen last and April.  Patient reports with the Viberzi her diarrhea was well controlled.  Patient continues to monitor her diet and things that cause irritation.  She is also using the IBS clear supplement that she is purchasing over-the-counter and essential oils. She has tried Imodium over the counter with no relief.   Patient denies fever, nausea, vomiting, weight loss, night sweats, melena, hematochezia, hematemesis.    Endoscopy: Reviewed the patient's most recent colonoscopy and EGD performed 7/11/2019 by Dr. Carroll revealed a small hiatal hernia normal mucosa throughout the esophagus stomach and duodenum.  The colon revealed a few diverticula in the sigmoid colon, grade 1 internal hemorrhoids and normal mucosa throughout.    Past Medical History:   Diagnosis Date   • Arthritis    • Bowel disease    • Cervicalgia 10/04/2013   • Diabetes (CMS/HCC)    • Diverticulitis    • Essential hypertension    • Headache    • High cholesterol    • Hyperlipemia    • Irritable bowel syndrome    • Limb swelling    • Neck pain    • Seasonal allergies    • Sprain and strain        Past Surgical History:   Procedure Laterality Date   • APPENDECTOMY     • BUNIONECTOMY     • COLONOSCOPY  2019   • ENDOSCOPY  2019   • HYSTERECTOMY     • KNEE SURGERY     • OTHER SURGICAL HISTORY      joint surgery   • OTHER SURGICAL HISTORY      metal implants   • SHOULDER SURGERY    "  • UPPER GASTROINTESTINAL ENDOSCOPY           Current Outpatient Medications:   •  cholecalciferol (VITAMIN D3) 25 MCG (1000 UT) tablet, Take 2,000 Units by mouth Daily., Disp: , Rfl:   •  dicyclomine (BENTYL) 20 MG tablet, Take 1 tablet by mouth 3 (Three) Times a Day for 90 days., Disp: 270 tablet, Rfl: 0  •  empagliflozin (Jardiance) 10 MG tablet tablet, Take 10 mg by mouth Daily., Disp: , Rfl:   •  estradiol (ESTRACE) 1 MG tablet, Take 1 mg by mouth Daily., Disp: , Rfl:   •  gabapentin (NEURONTIN) 100 MG capsule, Take 100 mg by mouth 3 (Three) Times a Day., Disp: , Rfl:   •  losartan (COZAAR) 25 MG tablet, Take 25 mg by mouth Daily., Disp: , Rfl:   •  lovastatin (MEVACOR) 20 MG tablet, Take 20 mg by mouth Every Night., Disp: , Rfl:   •  meloxicam (MOBIC) 7.5 MG tablet, Take 7.5 mg by mouth Daily., Disp: , Rfl:   •  metoprolol tartrate (LOPRESSOR) 50 MG tablet, Take 50 mg by mouth 2 (Two) Times a Day., Disp: , Rfl:      No Known Allergies    Family History   Problem Relation Age of Onset   • Diabetes Mother    • Arthritis Mother    • Osteoporosis Mother    • Diabetes Father    • Arthritis Father    • Arthritis Sister    • Osteoporosis Sister    • Diabetes Brother    • Diabetes Other    • Diabetes Daughter         Social History     Social History Narrative   • Not on file       Objective         Vital Signs:   /65 (BP Location: Left arm, Patient Position: Sitting, Cuff Size: Adult)   Pulse 56   Ht 172.7 cm (68\")   Wt 64.9 kg (143 lb 1.6 oz)   SpO2 100%   BMI 21.76 kg/m²       Physical Exam  Constitutional:       General: She is not in acute distress.     Appearance: Normal appearance.   HENT:      Head: Normocephalic.   Eyes:      Conjunctiva/sclera: Conjunctivae normal.      Pupils: Pupils are equal, round, and reactive to light.      Visual Fields: Right eye visual fields normal and left eye visual fields normal.   Neck:      Trachea: Trachea normal.   Cardiovascular:      Rate and Rhythm: Normal " rate and regular rhythm.      Heart sounds: Normal heart sounds.   Pulmonary:      Effort: Pulmonary effort is normal.      Breath sounds: Normal breath sounds and air entry.      Comments: Inspection of chest: normal appearance  Abdominal:      General: Abdomen is flat. Bowel sounds are normal.      Palpations: Abdomen is soft. There is no mass.      Tenderness: There is no guarding.   Musculoskeletal:      Right lower leg: No edema.      Left lower leg: No edema.   Skin:     Findings: No lesion.      Comments: Turgor is normal   Neurological:      Mental Status: She is alert and oriented to person, place, and time.   Psychiatric:         Mood and Affect: Mood and affect normal.         Result Review :                  Assessment and Plan    Diagnoses and all orders for this visit:    1. Irritable bowel syndrome with diarrhea (Primary)  -     dicyclomine (BENTYL) 20 MG tablet; Take 1 tablet by mouth 3 (Three) Times a Day for 90 days.  Dispense: 270 tablet; Refill: 0    Other orders  -     Cancel: Eluxadoline (Viberzi) 75 MG tablet; Take 75 mg by mouth 2 (two) times a day.  Dispense: 60 tablet; Refill: 3        65-year-old female presenting the office today for follow-up with a history of IBS predominant diarrhea.  Patient is currently on dicyclomine which is not providing complete relief for her diarrhea.  She was on Viberzi previously and doing well.  I will therefore have Dr. Carroll send in Viberzi 75 mg twice daily for the patient.  We will follow up in the office in 3 to 6 months.  Patient is agreeable to this plan.  She was to use dicyclomine as needed for abdominal cramps.  Patient to call the office with any questions or concerns.          Follow Up   Return in about 6 months (around 3/24/2022).  Patient was given instructions and counseling regarding her condition or for health maintenance advice. Please see specific information pulled into the AVS if appropriate.

## 2021-09-24 NOTE — PATIENT INSTRUCTIONS
"Diet for Irritable Bowel Syndrome  When you have irritable bowel syndrome (IBS), it is very important to eat the foods and follow the eating habits that are best for your condition. IBS may cause various symptoms such as pain in the abdomen, constipation, or diarrhea. Choosing the right foods can help to ease the discomfort from these symptoms. Work with your health care provider and diet and nutrition specialist (dietitian) to find the eating plan that will help to control your symptoms.  What are tips for following this plan?         · Keep a food diary. This will help you identify foods that cause symptoms. Write down:  ? What you eat and when you eat it.  ? What symptoms you have.  ? When symptoms occur in relation to your meals, such as \"pain in abdomen 2 hours after dinner.\"  · Eat your meals slowly and in a relaxed setting.  · Aim to eat 5-6 small meals per day. Do not skip meals.  · Drink enough fluid to keep your urine pale yellow.  · Ask your health care provider if you should take an over-the-counter probiotic to help restore healthy bacteria in your gut (digestive tract).  ? Probiotics are foods that contain good bacteria and yeasts.  · Your dietitian may have specific dietary recommendations for you based on your symptoms. He or she may recommend that you:  ? Avoid foods that cause symptoms. Talk with your dietitian about other ways to get the same nutrients that are in those problem foods.  ? Avoid foods with gluten. Gluten is a protein that is found in rye, wheat, and barley.  ? Eat more foods that contain soluble fiber. Examples of foods with high soluble fiber include oats, seeds, and certain fruits and vegetables. Take a fiber supplement if directed by your dietitian.  ? Reduce or avoid certain foods called FODMAPs. These are foods that contain carbohydrates that are hard to digest. Ask your doctor which foods contain these carbohydrates.  What foods are not recommended?  The following are some " foods and drinks that may make your symptoms worse:  · Fatty foods, such as french fries.  · Foods that contain gluten, such as pasta and cereal.  · Dairy products, such as milk, cheese, and ice cream.  · Chocolate.  · Alcohol.  · Products with caffeine, such as coffee.  · Carbonated drinks, such as soda.  · Foods that are high in FODMAPs. These include certain fruits and vegetables.  · Products with sweeteners such as honey, high fructose corn syrup, sorbitol, and mannitol.  The items listed above may not be a complete list of foods and beverages you should avoid. Contact a dietitian for more information.  What foods are good sources of fiber?  Your health care provider or dietitian may recommend that you eat more foods that contain fiber. Fiber can help to reduce constipation and other IBS symptoms. Add foods with fiber to your diet a little at a time so your body can get used to them. Too much fiber at one time might cause gas and swelling of your abdomen. The following are some foods that are good sources of fiber:  · Berries, such as raspberries, strawberries, and blueberries.  · Tomatoes.  · Carrots.  · Brown rice.  · Oats.  · Seeds, such as benjamin and pumpkin seeds.  The items listed above may not be a complete list of recommended sources of fiber. Contact your dietitian for more options.  Where to find more information  · International Foundation for Functional Gastrointestinal Disorders: www.iffgd.org  · National [place] of Diabetes and Digestive and Kidney Diseases: www.niddk.nih.gov  Summary  · When you have irritable bowel syndrome (IBS), it is very important to eat the foods and follow the eating habits that are best for your condition.  · IBS may cause various symptoms such as pain in the abdomen, constipation, or diarrhea.  · Choosing the right foods can help to ease the discomfort that comes from symptoms.  · Keep a food diary. This will help you identify foods that cause symptoms.  · Your health  care provider or diet and nutrition specialist (dietitian) may recommend that you eat more foods that contain fiber.  This information is not intended to replace advice given to you by your health care provider. Make sure you discuss any questions you have with your health care provider.  Document Revised: 04/08/2020 Document Reviewed: 08/21/2018  [employer] Patient Education © 2021 [employer]

## 2022-07-29 ENCOUNTER — TELEPHONE (OUTPATIENT)
Dept: GASTROENTEROLOGY | Facility: CLINIC | Age: 67
End: 2022-07-29

## 2022-07-29 RX ORDER — DICYCLOMINE HCL 20 MG
20 TABLET ORAL TAKE AS DIRECTED
Qty: 90 TABLET | Refills: 2 | Status: SHIPPED | OUTPATIENT
Start: 2022-07-29 | End: 2022-08-24 | Stop reason: SDUPTHER

## 2022-07-29 NOTE — TELEPHONE ENCOUNTER
Will send the patient a refill on the dicyclomine. Will let the pharmacy know that the patient will need and office visit schedule for further refills on this med.

## 2022-08-24 ENCOUNTER — TELEPHONE (OUTPATIENT)
Dept: GASTROENTEROLOGY | Facility: CLINIC | Age: 67
End: 2022-08-24

## 2022-08-24 RX ORDER — DICYCLOMINE HCL 20 MG
20 TABLET ORAL TAKE AS DIRECTED
Qty: 90 TABLET | Refills: 0 | Status: SHIPPED | OUTPATIENT
Start: 2022-08-24 | End: 2022-08-29 | Stop reason: SDUPTHER

## 2022-08-24 NOTE — TELEPHONE ENCOUNTER
Patient's pharmacy has called to request Bentyl for this patient. Patient has changed pharmacies. She now uses [facility].  This has been updated in [employer].

## 2022-08-29 ENCOUNTER — TELEPHONE (OUTPATIENT)
Dept: GASTROENTEROLOGY | Facility: CLINIC | Age: 67
End: 2022-08-29

## 2022-08-29 RX ORDER — DICYCLOMINE HCL 20 MG
20 TABLET ORAL TAKE AS DIRECTED
Qty: 90 TABLET | Refills: 0 | Status: SHIPPED | OUTPATIENT
Start: 2022-08-29 | End: 2023-02-27

## 2022-08-29 NOTE — TELEPHONE ENCOUNTER
[facility] called, Bentyl was sent on 08/24/22 and they did not receive it.  Please resend to [facility] in [place].

## 2023-01-17 ENCOUNTER — OFFICE VISIT (OUTPATIENT)
Dept: ORTHOPEDIC SURGERY | Facility: CLINIC | Age: 68
End: 2023-01-17
Payer: MEDICARE

## 2023-01-17 VITALS — HEIGHT: 68 IN | WEIGHT: 140 LBS | BODY MASS INDEX: 21.22 KG/M2

## 2023-01-17 DIAGNOSIS — M17.11 OSTEOARTHRITIS OF RIGHT KNEE, UNSPECIFIED OSTEOARTHRITIS TYPE: ICD-10-CM

## 2023-01-17 DIAGNOSIS — M25.561 RIGHT KNEE PAIN, UNSPECIFIED CHRONICITY: Primary | ICD-10-CM

## 2023-01-17 PROCEDURE — 99214 OFFICE O/P EST MOD 30 MIN: CPT | Performed by: ORTHOPAEDIC SURGERY

## 2023-01-17 PROCEDURE — 20610 DRAIN/INJ JOINT/BURSA W/O US: CPT | Performed by: ORTHOPAEDIC SURGERY

## 2023-01-17 RX ORDER — CYCLOBENZAPRINE HCL 10 MG
TABLET ORAL
COMMUNITY
End: 2023-02-24

## 2023-01-17 RX ORDER — FLUCONAZOLE 150 MG/1
TABLET ORAL
COMMUNITY

## 2023-01-17 RX ORDER — ESTRADIOL 0.5 MG/1
TABLET ORAL
COMMUNITY

## 2023-01-17 RX ORDER — NITROFURANTOIN 25; 75 MG/1; MG/1
CAPSULE ORAL
COMMUNITY
End: 2023-02-24

## 2023-01-17 RX ADMIN — LIDOCAINE HYDROCHLORIDE 5 ML: 10 INJECTION, SOLUTION INFILTRATION; PERINEURAL at 14:51

## 2023-01-17 RX ADMIN — TRIAMCINOLONE ACETONIDE 40 MG: 40 INJECTION, SUSPENSION INTRA-ARTICULAR; INTRAMUSCULAR at 14:51

## 2023-01-17 NOTE — PROGRESS NOTES
"Chief Complaint  Initial Evaluation of the Right Knee     Subjective      Rosaura Perry presents to [facility] for initial evaluation of the right knee. She has swelling and fluid on the lateral part of the knee for the last few months.  She was  in November and moved to her spouses house and there are stairs there.  She has had no treatment on the right knee.     Allergies   Allergen Reactions   • Latex Unknown - High Severity        Social History     Socioeconomic History   • Marital status:    Tobacco Use   • Smoking status: Never   • Smokeless tobacco: Never   Vaping Use   • Vaping Use: Never used   Substance and Sexual Activity   • Alcohol use: No   • Drug use: Never   • Sexual activity: Defer        Review of Systems     Objective   Vital Signs:   Ht 172.7 cm (68\")   Wt 63.5 kg (140 lb)   BMI 21.29 kg/m²       Physical Exam  Constitutional:       Appearance: Normal appearance. Patient is well-developed and normal weight.   HENT:      Head: Normocephalic.      Right Ear: Hearing and external ear normal.      Left Ear: Hearing and external ear normal.      Nose: Nose normal.   Eyes:      Conjunctiva/sclera: Conjunctivae normal.   Cardiovascular:      Rate and Rhythm: Normal rate.   Pulmonary:      Effort: Pulmonary effort is normal.      Breath sounds: No wheezing or rales.   Abdominal:      Palpations: Abdomen is soft.      Tenderness: There is no abdominal tenderness.   Musculoskeletal:      Cervical back: Normal range of motion.   Skin:     Findings: No rash.   Neurological:      Mental Status: Patient is alert and oriented to person, place, and time.   Psychiatric:         Mood and Affect: Mood and affect normal.         Judgment: Judgment normal.       Ortho Exam      RIGHT KNEE Flexion 120. Extension 0. Stable to varus/valgus stress. Stable to anterior/posterior drawer. Neurovascularly intact. Negative Kuldeep. Negative Lachman. Positive EHL, FHL, HS and " TA. Sensation intact to light touch all 5 nerves of the foot. Ambulates with Antalgic gait. Patella is well tracking. Calf supple, non-tender. Positive tenderness to the medial joint line. Positive tenderness to the lateral joint line. Positive Crepitus. Good strength to hamstrings, quadriceps, dorsiflexors, and plantar flexors.  Knee Extensor Mechanism intact       Large Joint Arthrocentesis: R knee  Date/Time: 1/17/2023 2:51 PM  Consent given by: patient  Site marked: site marked  Timeout: Immediately prior to procedure a time out was called to verify the correct patient, procedure, equipment, support staff and site/side marked as required   Supporting Documentation  Indications: pain   Procedure Details  Location: knee - R knee  Needle gauge: 21G.  Medications administered: 40 mg triamcinolone acetonide 40 MG/ML; 5 mL lidocaine 1 %  Patient tolerance: patient tolerated the procedure well with no immediate complications            Imaging Results (Most Recent)     Procedure Component Value Units Date/Time    XR Knee 3 View Right [[id number]] Resulted: 01/17/23 1408     Updated: 01/17/23 1410           Result Review :     X-Ray Report:  Right knee X-Ray  Indication: Evaluation of right knee  AP/Lateral and [place] view(s)  Findings: Advanced degenerative changes.  Moderate arthritis under the patella.   Prior studies available for comparison: No         No results found.           Assessment and Plan     Diagnoses and all orders for this visit:    1. Right knee pain, unspecified chronicity (Primary)  -     XR Knee 3 View Right    2. Osteoarthritis of right knee, unspecified osteoarthritis type      I reviewed the X-rays that were obtained today with the patient. Discussed the treatment options with the patient, operative vs non-operative. Discussed the risks and benefits of the right total knee arthroplasty. She wants to continue with conservative treatment of a steroid injection.  She knows in the future a right  total knee arthroplasty will be in the future. She was given HEP for right knee strengthening.     Call or return if worsening symptoms.    Follow Up     PRN      Patient was given instructions and counseling regarding her condition or for health maintenance advice. Please see specific information pulled into the AVS if appropriate.     Scribed for Hanh Nazario MD by Ev Hill MA.  01/17/23   14:27 EST    I have personally performed the services described in this document as scribed by the above individual and it is both accurate and complete. Hanh Nazario MD 01/18/23

## 2023-01-18 RX ORDER — LIDOCAINE HYDROCHLORIDE 10 MG/ML
5 INJECTION, SOLUTION INFILTRATION; PERINEURAL
Status: COMPLETED | OUTPATIENT
Start: 2023-01-17 | End: 2023-01-17

## 2023-01-18 RX ORDER — TRIAMCINOLONE ACETONIDE 40 MG/ML
40 INJECTION, SUSPENSION INTRA-ARTICULAR; INTRAMUSCULAR
Status: COMPLETED | OUTPATIENT
Start: 2023-01-17 | End: 2023-01-17

## 2023-02-22 NOTE — PROGRESS NOTES
Chief Complaint   IBS, abd. Cramping and pain, diarrhea    History of Present Illness       Rosaura Nelson is a 67 y.o. female who presents to [facility] for follow-up with a history of IBS predominant diarrhea.  Patient had her gallbladder removed in April 2022 and her Viberzi was stopped after gallbladder removal.  She continues to have altered bowel movements with oily like stools especially with cheese sauces and high fat foods.  She has been experiencing weight loss and is a type II diabetic.  Patient has concern for possible EPI.  Patient is doing lactose-free products.  She is doing probiotics.  Patient reports her diarrhea is usually postprandial.  She does use over-the-counter Imodium with some relief.  Patient takes Bentyl as needed.  Patient denies fever, nausea, vomiting, night sweats, melena, hematochezia, hematemesis.    Endoscopy: Review of the patient's most recent EGD and colonoscopy performed by Dr. Carroll on 07.11.2019 mild diverticulosis, grade 1 internal hemorrhoids, normal mucosa throughout the colon.  Colonic mucosa with lymphoid aggregate.      Results       Result Review :                             Past Medical History       Past Medical History:   Diagnosis Date   • Arthritis    • Bowel disease    • Cervicalgia 10/04/2013   • Cholelithiasis 2018    Gallbladder removed 2022   • Diabetes (HCC)    • Diverticulitis    • Diverticulitis of colon    • Essential hypertension    • Headache    • High cholesterol    • Hyperlipemia    • Irritable bowel syndrome    • Lactose intolerance    • Limb swelling    • Neck pain    • Seasonal allergies    • Sprain and strain        Past Surgical History:   Procedure Laterality Date   • APPENDECTOMY     • BUNIONECTOMY     • CHOLECYSTECTOMY     • COLONOSCOPY  2019   • ENDOSCOPY  2019   • HYSTERECTOMY     • KNEE SURGERY Left    • OTHER SURGICAL HISTORY      joint surgery   • OTHER SURGICAL HISTORY      metal implants   •  SHOULDER SURGERY Bilateral    • TUBAL ABDOMINAL LIGATION     • UPPER GASTROINTESTINAL ENDOSCOPY           Current Outpatient Medications:   •  cholecalciferol (VITAMIN D3) 25 MCG (1000 UT) tablet, Take 2,000 Units by mouth Daily., Disp: , Rfl:   •  cyclobenzaprine (FLEXERIL) 10 MG tablet, Daily., Disp: , Rfl:   •  dicyclomine (BENTYL) 20 MG tablet, Take 1 tablet by mouth Take As Directed. Patient will need to have an office visit schedule to get any further refills on this med  NEEDS OFFICE APPT FOR MORE....., Disp: 90 tablet, Rfl: 0  •  empagliflozin (JARDIANCE) 10 MG tablet tablet, Take 10 mg by mouth Daily., Disp: , Rfl:   •  estradiol (ESTRACE) 0.5 MG tablet, estradiol 0.5 mg tablet  TAKE ONE TABLET BY MOUTH EVERY DAY, Disp: , Rfl:   •  fluconazole (DIFLUCAN) 150 MG tablet, fluconazole 150 mg tablet  TAKE ONE TABLET BY MOUTH ONCE, Disp: , Rfl:   •  fluticasone (FLONASE) 50 MCG/ACT nasal spray, 2 sprays by Each Nare route Daily., Disp: , Rfl:   •  gabapentin (NEURONTIN) 100 MG capsule, Take 100 mg by mouth 3 (Three) Times a Day., Disp: , Rfl:   •  losartan (COZAAR) 25 MG tablet, Take 25 mg by mouth Daily., Disp: , Rfl:   •  lovastatin (MEVACOR) 20 MG tablet, Take 20 mg by mouth Every Night., Disp: , Rfl:   •  meloxicam (MOBIC) 7.5 MG tablet, Take 7.5 mg by mouth Daily., Disp: , Rfl:   •  metoprolol tartrate (LOPRESSOR) 50 MG tablet, Take 50 mg by mouth 2 (Two) Times a Day., Disp: , Rfl:      Allergies   Allergen Reactions   • Latex Unknown - High Severity       Family History   Problem Relation Age of Onset   • Diabetes Mother    • Arthritis Mother    • Osteoporosis Mother    • Diabetes Father    • Arthritis Father    • Arthritis Sister    • Osteoporosis Sister    • Diabetes Brother    • Diabetes Daughter    • Diabetes Other    • Colon cancer Neg Hx         Social History     Social History Narrative   • Not on file       Objective     Vital Signs:   /65 (BP Location: Right arm, Patient Position: Sitting,  "Cuff Size: Small Adult)   Pulse 59   Ht 172.7 cm (68\")   Wt 61.7 kg (136 lb)   SpO2 100%   BMI 20.68 kg/m²       Physical Exam  Constitutional:       General: She is not in acute distress.     Appearance: Normal appearance. She is well-developed and normal weight.   Eyes:      Conjunctiva/sclera: Conjunctivae normal.      Pupils: Pupils are equal, round, and reactive to light.      Visual Fields: Right eye visual fields normal and left eye visual fields normal.   Cardiovascular:      Rate and Rhythm: Normal rate and regular rhythm.      Heart sounds: Normal heart sounds.   Pulmonary:      Effort: Pulmonary effort is normal. No retractions.      Breath sounds: Normal breath sounds and air entry.      Comments: Inspection of chest: normal appearance  Abdominal:      General: Bowel sounds are normal.      Palpations: Abdomen is soft.      Tenderness: There is no abdominal tenderness.      Comments: No appreciable hepatosplenomegaly   Musculoskeletal:      Cervical back: Neck supple.      Right lower leg: No edema.      Left lower leg: No edema.   Lymphadenopathy:      Cervical: No cervical adenopathy.   Skin:     Findings: No lesion.      Comments: Turgor normal   Neurological:      Mental Status: She is alert and oriented to person, place, and time.   Psychiatric:         Mood and Affect: Mood and affect normal.           Assessment & Plan          Assessment and Plan    Diagnoses and all orders for this visit:    1. Irritable bowel syndrome with diarrhea (Primary)  -     CBC & Differential; Future  -     Comprehensive Metabolic Panel; Future  -     Amylase; Future  -     Lipase; Future  -     Enteric Parasite Panel - Stool, Per Rectum; Future  -     Enteric Bacterial Panel - Stool, Per Rectum; Future  -     Fecal Fat, Qualitative - Stool, Per Rectum; Future  -     Occult Blood, Fecal By Immunoassay - Stool, Per Rectum; Future  -     Clostridioides difficile Toxin, PCR - Stool, Per Rectum; Future  -     Fecal " Lactoferrin Qual. - Stool, Per Rectum; Future  -     Pancreatic Elastase, Fecal - Stool, Per Rectum; Future    2. Steatorrhea  -     CBC & Differential; Future  -     Comprehensive Metabolic Panel; Future  -     Amylase; Future  -     Lipase; Future  -     Enteric Parasite Panel - Stool, Per Rectum; Future  -     Enteric Bacterial Panel - Stool, Per Rectum; Future  -     Fecal Fat, Qualitative - Stool, Per Rectum; Future  -     Occult Blood, Fecal By Immunoassay - Stool, Per Rectum; Future  -     Clostridioides difficile Toxin, PCR - Stool, Per Rectum; Future  -     Fecal Lactoferrin Qual. - Stool, Per Rectum; Future  -     Pancreatic Elastase, Fecal - Stool, Per Rectum; Future    3. Diarrhea, unspecified type  -     CBC & Differential; Future  -     Comprehensive Metabolic Panel; Future  -     Amylase; Future  -     Lipase; Future  -     Enteric Parasite Panel - Stool, Per Rectum; Future  -     Enteric Bacterial Panel - Stool, Per Rectum; Future  -     Fecal Fat, Qualitative - Stool, Per Rectum; Future  -     Occult Blood, Fecal By Immunoassay - Stool, Per Rectum; Future  -     Clostridioides difficile Toxin, PCR - Stool, Per Rectum; Future  -     Fecal Lactoferrin Qual. - Stool, Per Rectum; Future  -     Pancreatic Elastase, Fecal - Stool, Per Rectum; Future      67-year-old female presenting the office today with a history of IBS diarrhea and steatorrhea with a history of cholecystectomy.  We will begin with stool studies for evaluation of her diarrhea as listed above.  If pancreatic elastase is low would consider Creon or Zenpep.  I have ordered labs to include a CBC, CMP, amylase and lipase.  If negative stool studies and normal labs would proceed with colestipol 2 g twice daily for IBS diarrhea and postcholecystectomy.  Patient will follow-up in the office in 3 months.  Patient agreeable to this plan will call with any questions or concerns.          Follow Up       Follow Up   Return in about 3 months (around  5/24/2023).  Patient was given instructions and counseling regarding her condition or for health maintenance advice. Please see specific information pulled into the AVS if appropriate.

## 2023-02-24 ENCOUNTER — OFFICE VISIT (OUTPATIENT)
Dept: GASTROENTEROLOGY | Facility: CLINIC | Age: 68
End: 2023-02-24
Payer: MEDICARE

## 2023-02-24 VITALS
HEIGHT: 68 IN | OXYGEN SATURATION: 100 % | WEIGHT: 136 LBS | BODY MASS INDEX: 20.61 KG/M2 | HEART RATE: 59 BPM | SYSTOLIC BLOOD PRESSURE: 112 MMHG | DIASTOLIC BLOOD PRESSURE: 65 MMHG

## 2023-02-24 DIAGNOSIS — R19.7 DIARRHEA, UNSPECIFIED TYPE: ICD-10-CM

## 2023-02-24 DIAGNOSIS — K90.9 STEATORRHEA: ICD-10-CM

## 2023-02-24 DIAGNOSIS — K58.0 IRRITABLE BOWEL SYNDROME WITH DIARRHEA: Primary | ICD-10-CM

## 2023-02-24 PROCEDURE — 99214 OFFICE O/P EST MOD 30 MIN: CPT | Performed by: NURSE PRACTITIONER

## 2023-02-24 RX ORDER — CYCLOBENZAPRINE HCL 10 MG
TABLET ORAL EVERY 24 HOURS
COMMUNITY

## 2023-02-24 RX ORDER — FLUTICASONE PROPIONATE 50 MCG
2 SPRAY, SUSPENSION (ML) NASAL DAILY
COMMUNITY
Start: 2022-12-13

## 2023-02-27 NOTE — TELEPHONE ENCOUNTER
Dicyclomine 20mg    Pt called stated she would like a refill on this medication she is on her last fill

## 2023-02-28 RX ORDER — DICYCLOMINE HCL 20 MG
20 TABLET ORAL EVERY 6 HOURS PRN
Qty: 120 TABLET | Refills: 3 | Status: SHIPPED | OUTPATIENT
Start: 2023-02-28

## 2023-02-28 RX ORDER — DICYCLOMINE HCL 20 MG
20 TABLET ORAL EVERY 6 HOURS PRN
Qty: 120 TABLET | Refills: 3 | Status: SHIPPED | OUTPATIENT
Start: 2023-02-28 | End: 2023-02-28 | Stop reason: SDUPTHER

## 2023-03-08 DIAGNOSIS — K90.9 STEATORRHEA: ICD-10-CM

## 2023-03-08 DIAGNOSIS — K58.0 IRRITABLE BOWEL SYNDROME WITH DIARRHEA: ICD-10-CM

## 2023-03-08 DIAGNOSIS — R19.7 DIARRHEA, UNSPECIFIED TYPE: ICD-10-CM

## 2023-03-13 ENCOUNTER — TELEPHONE (OUTPATIENT)
Dept: GASTROENTEROLOGY | Facility: CLINIC | Age: 68
End: 2023-03-13
Payer: MEDICARE

## 2023-03-13 DIAGNOSIS — K90.9 STEATORRHEA: ICD-10-CM

## 2023-03-13 DIAGNOSIS — K58.0 IRRITABLE BOWEL SYNDROME WITH DIARRHEA: ICD-10-CM

## 2023-03-13 DIAGNOSIS — R19.7 DIARRHEA, UNSPECIFIED TYPE: ICD-10-CM

## 2023-03-13 NOTE — TELEPHONE ENCOUNTER
----- Message from ISRRAEL Knapp sent at 3/13/2023  3:57 PM EDT -----  C. difficile negative.  Occult blood negative.  Other stool studies sent out.

## 2023-04-12 ENCOUNTER — TELEPHONE (OUTPATIENT)
Dept: GASTROENTEROLOGY | Facility: CLINIC | Age: 68
End: 2023-04-12
Payer: MEDICARE

## 2023-04-12 RX ORDER — PANCRELIPASE LIPASE, PANCRELIPASE PROTEASE, PANCRELIPASE AMYLASE 40000; 126000; 168000 [USP'U]/1; [USP'U]/1; [USP'U]/1
40000 CAPSULE, DELAYED RELEASE ORAL TAKE AS DIRECTED
Qty: 300 CAPSULE | Refills: 5 | Status: SHIPPED | OUTPATIENT
Start: 2023-04-12 | End: 2023-05-12

## 2023-04-12 NOTE — TELEPHONE ENCOUNTER
----- Message from ISRRAEL Taveras sent at 4/12/2023  8:18 AM EDT -----  I have reviewed the patient's stool studies.  Fecal fat, lactoferrin, parasite panel, and culture are negative.  Pancreatic elastase shows moderate pancreatic insufficiency which indicates the pancreas is not secreting sufficient enzymes to appropriately digest food.  This finding can be commonly associated with bloating and loose stool.  I have prescribed Zenpep as a supplement for these enzymes.  This medication must be taken with food to be effective.  Please take 1 capsules with every meal and meal.

## 2023-05-04 ENCOUNTER — TELEPHONE (OUTPATIENT)
Dept: GASTROENTEROLOGY | Facility: CLINIC | Age: 68
End: 2023-05-04
Payer: MEDICARE

## 2023-05-04 RX ORDER — MONTELUKAST SODIUM 4 MG/1
1 TABLET, CHEWABLE ORAL 2 TIMES DAILY
Qty: 120 TABLET | Refills: 1 | Status: SHIPPED | OUTPATIENT
Start: 2023-05-04

## 2023-05-04 NOTE — TELEPHONE ENCOUNTER
Pt called in stated that since taking the zenpep she has started itching all over more in private area, she has been taking diflucan. Spoke to Virgen ARCOS on this she wants pt to stop Zenpep and we are sending in colestipol to pt to try this 1 tab 2 times daily.

## 2023-05-16 NOTE — PROGRESS NOTES
Chief Complaint   3m follow up    History of Present Illness       Rosaura Nelson is a 67 y.o. female who presents to [facility] for follow-up with a history of IBS diarrhea and steatorrhea with a history of cholecystectomy.  At the last office visit stool studies were performed.  It was noted that the patient had pancreatic insufficiency and patient was placed on Zenpep while on the Zenpep patient developed irritation and itching to the perineum and discontinued the medication.  Patient was then started on colestipol 1 g twice daily.  Today the patient reports good control of diarrhea with bowel movements with 1-2 per day. Change in her diabetic medication coming off of Jardiance and transitioning to Januvia.  Patient's  had a heart attack last week so there has been increased stress but bowel habits have remained normal.  Patient denies fever, nausea, vomiting, weight loss, night sweats, melena, hematochezia, hematemesis.       Endoscopy: Review of the patient's most recent EGD and colonoscopy performed by Dr. Carroll on 07.11.2019 mild diverticulosis, grade 1 internal hemorrhoids, normal mucosa throughout the colon.  Colonic mucosa with lymphoid aggregate.    Most recent labs on 03.07.2023  Results       Result Review :                             Past Medical History       Past Medical History:   Diagnosis Date   • Arthritis    • Bowel disease    • Cervicalgia 10/04/2013   • Cholelithiasis 2018    Gallbladder removed 2022   • Diabetes    • Diverticulitis    • Diverticulitis of colon    • Essential hypertension    • Headache    • High cholesterol    • Hyperlipemia    • Irritable bowel syndrome    • Lactose intolerance    • Limb swelling    • Neck pain    • Seasonal allergies    • Sprain and strain        Past Surgical History:   Procedure Laterality Date   • APPENDECTOMY     • BUNIONECTOMY     • CHOLECYSTECTOMY     • COLONOSCOPY  2019   • ENDOSCOPY  2019   •  HYSTERECTOMY     • KNEE SURGERY Left    • OTHER SURGICAL HISTORY      joint surgery   • OTHER SURGICAL HISTORY      metal implants   • SHOULDER SURGERY Bilateral    • TUBAL ABDOMINAL LIGATION     • UPPER GASTROINTESTINAL ENDOSCOPY           Current Outpatient Medications:   •  cholecalciferol (VITAMIN D3) 25 MCG (1000 UT) tablet, Take 2 tablets by mouth Daily., Disp: , Rfl:   •  colestipol (COLESTID) 1 g tablet, Take 1 tablet by mouth 2 (Two) Times a Day., Disp: 120 tablet, Rfl: 1  •  cyclobenzaprine (FLEXERIL) 10 MG tablet, Daily., Disp: , Rfl:   •  dicyclomine (BENTYL) 20 MG tablet, Take 1 tablet by mouth Every 6 (Six) Hours As Needed (abdominal pain and diarrhea)., Disp: 120 tablet, Rfl: 3  •  estradiol (ESTRACE) 0.5 MG tablet, estradiol 0.5 mg tablet  TAKE ONE TABLET BY MOUTH EVERY DAY, Disp: , Rfl:   •  fluticasone (FLONASE) 50 MCG/ACT nasal spray, 2 sprays by Each Nare route Daily., Disp: , Rfl:   •  gabapentin (NEURONTIN) 100 MG capsule, Take 1 capsule by mouth 3 (Three) Times a Day., Disp: , Rfl:   •  GNP Anti-Diarrheal 2 MG capsule, Take 1 capsule by mouth 2 (Two) Times a Day As Needed for Diarrhea., Disp: , Rfl:   •  losartan (COZAAR) 25 MG tablet, Take 1 tablet by mouth Daily., Disp: , Rfl:   •  lovastatin (MEVACOR) 20 MG tablet, Take 1 tablet by mouth Every Night., Disp: , Rfl:   •  meloxicam (MOBIC) 7.5 MG tablet, Take 1 tablet by mouth Daily., Disp: , Rfl:   •  metoprolol tartrate (LOPRESSOR) 50 MG tablet, Take 1 tablet by mouth 2 (Two) Times a Day., Disp: , Rfl:   •  empagliflozin (JARDIANCE) 10 MG tablet tablet, Take 10 mg by mouth Daily. (Patient not taking: Reported on 5/23/2023), Disp: , Rfl:   •  fluconazole (DIFLUCAN) 150 MG tablet, fluconazole 150 mg tablet  TAKE ONE TABLET BY MOUTH ONCE, Disp: , Rfl:      Allergies   Allergen Reactions   • Latex Unknown - High Severity       Family History   Problem Relation Age of Onset   • Diabetes Mother    • Arthritis Mother    • Osteoporosis Mother    •  "Diabetes Father    • Arthritis Father    • Arthritis Sister    • Osteoporosis Sister    • Diabetes Brother    • Diabetes Daughter    • Diabetes Other    • Colon cancer Neg Hx         Social History     Social History Narrative   • Not on file       Objective   Vital Signs:   /68   Pulse 66   Ht 172.7 cm (68\")   Wt 63.4 kg (139 lb 12.8 oz)   SpO2 100%   BMI 21.26 kg/m²       Physical Exam  Constitutional:       General: She is not in acute distress.     Appearance: Normal appearance. She is well-developed and normal weight.   Eyes:      Conjunctiva/sclera: Conjunctivae normal.      Pupils: Pupils are equal, round, and reactive to light.      Visual Fields: Right eye visual fields normal and left eye visual fields normal.   Cardiovascular:      Rate and Rhythm: Normal rate and regular rhythm.      Heart sounds: Normal heart sounds.   Pulmonary:      Effort: Pulmonary effort is normal. No retractions.      Breath sounds: Normal breath sounds and air entry.      Comments: Inspection of chest: normal appearance  Abdominal:      General: Bowel sounds are normal.      Palpations: Abdomen is soft.      Tenderness: There is no abdominal tenderness.      Comments: No appreciable hepatosplenomegaly   Musculoskeletal:      Cervical back: Neck supple.      Right lower leg: No edema.      Left lower leg: No edema.   Lymphadenopathy:      Cervical: No cervical adenopathy.   Skin:     Findings: No lesion.      Comments: Turgor normal   Neurological:      Mental Status: She is alert and oriented to person, place, and time.   Psychiatric:         Mood and Affect: Mood and affect normal.           Assessment & Plan          Assessment and Plan    Diagnoses and all orders for this visit:    1. Irritable bowel syndrome with diarrhea (Primary)    2. Steatorrhea      67-year-old female presenting the office today for follow-up with a history of IBS diarrhea and steatorrhea with a history of cholecystectomy.  Patient had " moderate pancreatic insufficiency but did not tolerate Zenpep.  Colestipol started which is controlling the patient's diarrhea well.  She will continue this medication and follow-up in 1 year.  Patient agreeable to this plan will call with any questions or concerns.          Follow Up       Follow Up   Return in about 1 year (around 5/23/2024).  Patient was given instructions and counseling regarding her condition or for health maintenance advice. Please see specific information pulled into the AVS if appropriate.

## 2023-05-23 ENCOUNTER — OFFICE VISIT (OUTPATIENT)
Dept: GASTROENTEROLOGY | Facility: CLINIC | Age: 68
End: 2023-05-23
Payer: MEDICARE

## 2023-05-23 VITALS
DIASTOLIC BLOOD PRESSURE: 68 MMHG | WEIGHT: 139.8 LBS | HEIGHT: 68 IN | HEART RATE: 66 BPM | BODY MASS INDEX: 21.19 KG/M2 | SYSTOLIC BLOOD PRESSURE: 103 MMHG | OXYGEN SATURATION: 100 %

## 2023-05-23 DIAGNOSIS — K90.9 STEATORRHEA: ICD-10-CM

## 2023-05-23 DIAGNOSIS — K58.0 IRRITABLE BOWEL SYNDROME WITH DIARRHEA: Primary | ICD-10-CM

## 2023-05-23 PROBLEM — K58.9 IRRITABLE BOWEL SYNDROME: Status: ACTIVE | Noted: 2023-05-23

## 2023-05-23 PROBLEM — R51.9 HEADACHE: Status: ACTIVE | Noted: 2023-05-23

## 2023-05-23 PROBLEM — M48.02 CERVICAL STENOSIS OF SPINAL CANAL: Status: ACTIVE | Noted: 2022-07-21

## 2023-05-23 PROBLEM — M47.812 CERVICAL SPONDYLOSIS: Status: ACTIVE | Noted: 2022-07-21

## 2023-05-23 PROBLEM — J30.2 SEASONAL ALLERGIC RHINITIS: Status: ACTIVE | Noted: 2023-05-23

## 2023-05-23 PROBLEM — M54.2 NECK PAIN: Status: ACTIVE | Noted: 2023-05-23

## 2023-05-23 PROBLEM — M79.89 LIMB SWELLING: Status: ACTIVE | Noted: 2023-05-23

## 2023-05-23 PROBLEM — E11.9 DIABETES: Status: ACTIVE | Noted: 2023-05-23

## 2023-05-23 PROBLEM — M19.90 ARTHRITIS: Status: ACTIVE | Noted: 2023-05-23

## 2023-05-23 PROBLEM — M50.30 DDD (DEGENERATIVE DISC DISEASE), CERVICAL: Status: ACTIVE | Noted: 2022-07-21

## 2023-05-23 PROBLEM — I10 ESSENTIAL HYPERTENSION: Status: ACTIVE | Noted: 2023-05-23

## 2023-05-23 PROBLEM — E78.00 HIGH CHOLESTEROL: Status: ACTIVE | Noted: 2023-05-23

## 2023-05-23 PROCEDURE — 1159F MED LIST DOCD IN RCRD: CPT | Performed by: NURSE PRACTITIONER

## 2023-05-23 PROCEDURE — 3074F SYST BP LT 130 MM HG: CPT | Performed by: NURSE PRACTITIONER

## 2023-05-23 PROCEDURE — 3078F DIAST BP <80 MM HG: CPT | Performed by: NURSE PRACTITIONER

## 2023-05-23 PROCEDURE — 1160F RVW MEDS BY RX/DR IN RCRD: CPT | Performed by: NURSE PRACTITIONER

## 2023-05-23 PROCEDURE — 99213 OFFICE O/P EST LOW 20 MIN: CPT | Performed by: NURSE PRACTITIONER

## 2023-05-23 RX ORDER — LOPERAMIDE HCL 2 MG/1
1 CAPSULE, LIQUID FILLED ORAL 2 TIMES DAILY PRN
COMMUNITY
Start: 2023-03-15

## 2023-05-23 RX ORDER — SITAGLIPTIN 100 MG/1
1 TABLET, FILM COATED ORAL DAILY
COMMUNITY
Start: 2023-05-17 | End: 2023-05-23

## 2023-05-30 ENCOUNTER — TELEPHONE (OUTPATIENT)
Dept: ORTHOPEDIC SURGERY | Facility: CLINIC | Age: 68
End: 2023-05-30
Payer: MEDICARE

## 2023-05-30 NOTE — TELEPHONE ENCOUNTER
Caller: Rosaura Nelson    Relationship to patient: Self    Best call back number: [phone]    Chief complaint: RIGHT KNEE    Type of visit: GEL INJ     Requested date: ASAP          Additional notes: PATIENT STATES DR. SAUL TOLD HER IF THE STEROID SHOT DOESN'T HELP SHE CAN TRY GEL INJ WOULD NEED TO MAKE SURE APPROVED VIA [facility]

## 2023-06-13 ENCOUNTER — TELEPHONE (OUTPATIENT)
Dept: ORTHOPEDIC SURGERY | Facility: CLINIC | Age: 68
End: 2023-06-13

## 2023-06-13 ENCOUNTER — OFFICE VISIT (OUTPATIENT)
Dept: ORTHOPEDIC SURGERY | Facility: CLINIC | Age: 68
End: 2023-06-13
Payer: MEDICARE

## 2023-06-13 VITALS — HEIGHT: 68 IN | WEIGHT: 139 LBS | BODY MASS INDEX: 21.07 KG/M2

## 2023-06-13 DIAGNOSIS — M17.11 OSTEOARTHRITIS OF RIGHT KNEE, UNSPECIFIED OSTEOARTHRITIS TYPE: Primary | ICD-10-CM

## 2023-06-13 NOTE — PATIENT INSTRUCTIONS
Right knee Synvisc injection administered today in office. Advised on 6 month duration between injections. Can consider steroid injection after 6 weeks, if needed.     Follow up as needed. Call with questions or concerns.

## 2023-06-13 NOTE — TELEPHONE ENCOUNTER
"PATIENT STATES SHE HAS NOTICED A \"KNOT\" AT THE SIGHT OF THE INJECTION. SHE STATES IT IS A SOFT SWOLLEN AREA. SHE DOES NOT HAVE ANY INCREASED PAIN IN THE KNEE FROM THIS SWELLING OR FROM THE INJECTION. SHE HAS BEEN UP WALKING, AND SHOPPING SINCE RECEIVING THE INJECTION. ADVISED PATIENT THAT THIS CAN HAPPEN AFTER THESE INJECTIONS. ONCE SHE GETS HOME SHE SHOULD PROP THE LEG UP AND USE ICE OR HEAT TO HELP REDUCE THE SWELLING AT THE INJECTION SITE. PATIENT ADVISED TO CALL WITH ANY NEW OR WORSENING ISSUES. SHE VOICED UNDERSTANDING.   "

## 2023-06-13 NOTE — PROGRESS NOTES
"Chief Complaint  Pain and Follow-up of the Right Knee    Subjective      Rosaura Nelson presents to [facility] for follow-up of right knee pain and osteoarthritis.  Patient previously seen in office on 1/17/2023 with x-rays revealing advanced degenerative changes of the right knee.  She received a right knee steroid injection.  She presents today independently ambulatory without use of assistive device.  She presents with insurance authorization for right knee Synvisc 1 injection, requesting to proceed with this in office today.    Objective   Allergies   Allergen Reactions    Latex Unknown - High Severity       Vital Signs:   Ht 172.7 cm (68\")   Wt 63 kg (139 lb)   BMI 21.13 kg/m²       Physical Exam    Constitutional: Awake, alert. Well nourished appearance.    Integumentary: Warm, dry, intact. No obvious rashes.    HENT: Atraumatic, normocephalic.   Respiratory: Non labored respirations .   Cardiovascular: Intact peripheral pulses.    Psychiatric: Normal mood and affect. A&O X3    Ortho Exam  Right knee: Skin is warm, dry, and intact.  Tenderness to palpation of joint lines.  Crepitus with ROM.  -3 degrees of extension and flexion 120 degrees.  Full plantarflexion and dorsiflexion of the ankle.  Sensation intact light touch.  Distal neurovascular intact.  Smooth sit to stand transition.  Patient fully weightbearing with mildly antalgic gait.    Imaging Results (Most Recent)       None             Large Joint Arthrocentesis: R knee  Date/Time: 6/13/2023 11:09 AM  Consent given by: patient  Site marked: site marked  Timeout: Immediately prior to procedure a time out was called to verify the correct patient, procedure, equipment, support staff and site/side marked as required   Supporting Documentation  Indications: pain   Procedure Details  Location: knee - R knee  Needle gauge: 21g.  Medications administered: 48 mg hylan 48 MG/6ML  Patient tolerance: patient tolerated the " procedure well with no immediate complications            Assessment and Plan   Problem List Items Addressed This Visit    None  Visit Diagnoses       Osteoarthritis of right knee, unspecified osteoarthritis type    -  Primary            Follow Up   Return in about 6 weeks (around 7/25/2023).    Tobacco Use: Low Risk     Smoking Tobacco Use: Never    Smokeless Tobacco Use: Never    Passive Exposure: Past     Patient is a non-smoker.  Did not discuss tobacco cessation options.    Patient Instructions   Right knee Synvisc injection administered today in office. Advised on 6 month duration between injections. Can consider steroid injection after 6 weeks, if needed.     Follow up as needed. Call with questions or concerns.     Patient was given instructions and counseling regarding her condition or for health maintenance advice. Please see specific information pulled into the AVS if appropriate.

## 2023-06-14 RX ORDER — DICYCLOMINE HCL 20 MG
TABLET ORAL
Qty: 120 TABLET | Refills: 3 | Status: SHIPPED | OUTPATIENT
Start: 2023-06-14

## 2023-08-28 ENCOUNTER — TELEPHONE (OUTPATIENT)
Dept: GASTROENTEROLOGY | Facility: CLINIC | Age: 68
End: 2023-08-28
Payer: MEDICARE

## 2023-08-28 NOTE — TELEPHONE ENCOUNTER
Caller: ABIEL GARZON    Best call back number: [phone]    Requested Prescriptions: [facility]     Pharmacy where request should be sent:    [facility] [id number] - [place], [place] - [address] AT [facility] [address]  - [phone]  - [phone] FX  [address], [facility] [address]  Phone: [phone]  Fax: [phone]     Last office visit with prescribing clinician: 5/23/2023   Last telemedicine visit with prescribing clinician: Visit date not found   Next office visit with prescribing clinician: 5/21/2024     Additional details provided by patient: JUST FILLED HER LAST REFILL, HAS A MONTH LEFT. ALSO WANTED TO KNOW IF THIS PRESCRIPTION COULD BE CHANGED FOR A 90 DAYS SUPPLY.     Does the patient have less than a 3 day supply:  [] Yes  [x] No    Would you like a call back once the refill request has been completed: [x] Yes [] No    If the office needs to give you a call back, can they leave a voicemail: [x] Yes [] No    Jose Nam Rep   08/28/23 10:01 EDT

## 2023-08-29 RX ORDER — MONTELUKAST SODIUM 4 MG/1
1 TABLET, CHEWABLE ORAL 2 TIMES DAILY
Qty: 180 TABLET | Refills: 1 | Status: SHIPPED | OUTPATIENT
Start: 2023-08-29

## 2023-08-29 NOTE — TELEPHONE ENCOUNTER
Patient requested a 90 day supply of colestid.    Last office visit 5/23/23  Upcoming appointment 5/21/24

## 2024-03-04 RX ORDER — MONTELUKAST SODIUM 4 MG/1
1 TABLET, CHEWABLE ORAL 2 TIMES DAILY
Qty: 180 TABLET | Refills: 1 | Status: SHIPPED | OUTPATIENT
Start: 2024-03-04

## 2024-05-20 NOTE — PROGRESS NOTES
"Chief Complaint   1 yr F/U    History of Present Illness       Rosaura Nelson is a 68 y.o. female who presents to [facility] for follow-up with a history of IBS diarrhea and steatorrhea with a history of cholecystectomy.  Today the patient reports good control of diarrhea with bowel movements with 1-2 per day with use of colestipol. Patient went through divorce,increased stress but bowel habits have remained normal.  Patient denies fever, nausea, vomiting, weight loss, night sweats, melena, hematochezia, hematemesis.     Endoscopy: Review of the patient's most recent EGD and colonoscopy performed by Dr. Carroll on 07.11.2019 mild diverticulosis, grade 1 internal hemorrhoids, normal mucosa throughout the colon.  Colonic mucosa with lymphoid aggregate.    Results       Result Review :   The following data was reviewed by: ISRRAEL Knapp on 05/21/2024:          Iron Profile No results found for: \"IRON\", \"TIBC\", \"LABIRON\", \"TRANSFERRIN\"  Ferritin No results found for: \"FERRITIN\"            Past Medical History       Past Medical History:   Diagnosis Date    Arthritis     Bowel disease     Cervicalgia 10/04/2013    Cholelithiasis 2018    Gallbladder removed 2022    Diabetes     Diverticulitis     Diverticulitis of colon     Essential hypertension     Headache     High cholesterol     Hyperlipemia     Irritable bowel syndrome     Lactose intolerance     Limb swelling     Neck pain     Seasonal allergies     Sprain and strain        Past Surgical History:   Procedure Laterality Date    APPENDECTOMY      BUNIONECTOMY      CHOLECYSTECTOMY      COLONOSCOPY  2019    ENDOSCOPY  2019    HYSTERECTOMY      KNEE SURGERY Left     OTHER SURGICAL HISTORY      joint surgery    OTHER SURGICAL HISTORY      metal implants    SHOULDER SURGERY Bilateral     TUBAL ABDOMINAL LIGATION      UPPER GASTROINTESTINAL ENDOSCOPY           Current Outpatient Medications:     baclofen (LIORESAL) 10 MG tablet, " "Take 1 tablet by mouth 3 (Three) Times a Day As Needed., Disp: , Rfl:     cholecalciferol (VITAMIN D3) 25 MCG (1000 UT) tablet, Take 2 tablets by mouth Daily., Disp: , Rfl:     colestipol (COLESTID) 1 g tablet, Take 1 tablet by mouth 2 (Two) Times a Day., Disp: 180 tablet, Rfl: 3    dicyclomine (BENTYL) 20 MG tablet, Take 1 tablet by mouth Every 6 (Six) Hours., Disp: 120 tablet, Rfl: 3    estradiol (ESTRACE) 0.5 MG tablet, estradiol 0.5 mg tablet  TAKE ONE TABLET BY MOUTH EVERY DAY, Disp: , Rfl:     levothyroxine (SYNTHROID, LEVOTHROID) 25 MCG tablet, TAKE ONE TABLET BY MOUTH EVERY DAY BEFORE BREAKFAST, Disp: , Rfl:     losartan (COZAAR) 25 MG tablet, Take 1 tablet by mouth Daily., Disp: , Rfl:     lovastatin (MEVACOR) 20 MG tablet, Take 1 tablet by mouth Every Night., Disp: , Rfl:     meloxicam (MOBIC) 7.5 MG tablet, Take 1 tablet by mouth Daily., Disp: , Rfl:     metFORMIN ER (GLUCOPHAGE-XR) 500 MG 24 hr tablet, Take 1 tablet by mouth Daily., Disp: , Rfl:     metoprolol tartrate (LOPRESSOR) 50 MG tablet, Take 1 tablet by mouth 2 (Two) Times a Day., Disp: , Rfl:      Allergies   Allergen Reactions    Latex Unknown - High Severity       Family History   Problem Relation Age of Onset    Diabetes Mother     Arthritis Mother     Osteoporosis Mother     Diabetes Father     Arthritis Father     Arthritis Sister     Osteoporosis Sister     Diabetes Brother     Diabetes Daughter     Diabetes Other     Colon cancer Neg Hx         Social History     Social History Narrative    Not on file       Objective     Vital Signs:   /70 (BP Location: Left arm, Patient Position: Sitting, Cuff Size: Adult)   Pulse 57   Ht 172.7 cm (68\")   Wt 66.9 kg (147 lb 6.4 oz)   SpO2 100%   BMI 22.41 kg/m²       Physical Exam  Constitutional:       Appearance: Normal appearance.   Pulmonary:      Effort: Pulmonary effort is normal.   Neurological:      General: No focal deficit present.      Mental Status: She is alert and oriented to " person, place, and time.   Psychiatric:         Mood and Affect: Mood normal.         Behavior: Behavior normal.           Assessment & Plan          Assessment and Plan    Diagnoses and all orders for this visit:    1. Irritable bowel syndrome with diarrhea (Primary)    Other orders  -     colestipol (COLESTID) 1 g tablet; Take 1 tablet by mouth 2 (Two) Times a Day.  Dispense: 180 tablet; Refill: 3  -     dicyclomine (BENTYL) 20 MG tablet; Take 1 tablet by mouth Every 6 (Six) Hours.  Dispense: 120 tablet; Refill: 3      68-year-old female presenting the office today for 1 year follow-up with a history of IBS diarrhea and history of cholecystectomy.  I have refilled the patient's colestipol and Bentyl at this time as this is working well for her.  Will follow-up in 1 year.  Patient agreeable to this plan will call with any questions or concerns.      Follow Up       Follow Up   Return in about 1 year (around 5/21/2025).  Patient was given instructions and counseling regarding her condition or for health maintenance advice. Please see specific information pulled into the AVS if appropriate.

## 2024-05-21 ENCOUNTER — OFFICE VISIT (OUTPATIENT)
Dept: GASTROENTEROLOGY | Facility: CLINIC | Age: 69
End: 2024-05-21
Payer: MEDICARE

## 2024-05-21 VITALS
BODY MASS INDEX: 22.34 KG/M2 | DIASTOLIC BLOOD PRESSURE: 70 MMHG | HEART RATE: 57 BPM | HEIGHT: 68 IN | WEIGHT: 147.4 LBS | OXYGEN SATURATION: 100 % | SYSTOLIC BLOOD PRESSURE: 116 MMHG

## 2024-05-21 DIAGNOSIS — K58.0 IRRITABLE BOWEL SYNDROME WITH DIARRHEA: Primary | ICD-10-CM

## 2024-05-21 RX ORDER — MONTELUKAST SODIUM 4 MG/1
1 TABLET, CHEWABLE ORAL 2 TIMES DAILY
Qty: 180 TABLET | Refills: 3 | Status: SHIPPED | OUTPATIENT
Start: 2024-05-21

## 2024-05-21 RX ORDER — DICYCLOMINE HCL 20 MG
20 TABLET ORAL EVERY 6 HOURS
Qty: 120 TABLET | Refills: 3 | Status: SHIPPED | OUTPATIENT
Start: 2024-05-21

## 2024-05-21 RX ORDER — BACLOFEN 10 MG/1
1 TABLET ORAL 3 TIMES DAILY PRN
COMMUNITY
Start: 2024-04-29

## 2024-05-21 RX ORDER — LEVOTHYROXINE SODIUM 0.03 MG/1
TABLET ORAL
COMMUNITY

## 2024-05-21 RX ORDER — METFORMIN HYDROCHLORIDE 500 MG/1
1 TABLET, EXTENDED RELEASE ORAL DAILY
COMMUNITY

## 2025-01-14 ENCOUNTER — OFFICE VISIT (OUTPATIENT)
Dept: ORTHOPEDIC SURGERY | Facility: CLINIC | Age: 70
End: 2025-01-14
Payer: MEDICARE

## 2025-01-14 ENCOUNTER — PREP FOR SURGERY (OUTPATIENT)
Dept: OTHER | Facility: HOSPITAL | Age: 70
End: 2025-01-14
Payer: MEDICARE

## 2025-01-14 VITALS
WEIGHT: 149.6 LBS | DIASTOLIC BLOOD PRESSURE: 68 MMHG | BODY MASS INDEX: 22.67 KG/M2 | HEART RATE: 75 BPM | OXYGEN SATURATION: 95 % | HEIGHT: 68 IN | SYSTOLIC BLOOD PRESSURE: 112 MMHG

## 2025-01-14 DIAGNOSIS — M17.11 OSTEOARTHRITIS OF RIGHT KNEE, UNSPECIFIED OSTEOARTHRITIS TYPE: Primary | ICD-10-CM

## 2025-01-14 DIAGNOSIS — M25.561 RIGHT KNEE PAIN, UNSPECIFIED CHRONICITY: Primary | ICD-10-CM

## 2025-01-14 DIAGNOSIS — M17.11 OSTEOARTHRITIS OF RIGHT KNEE, UNSPECIFIED OSTEOARTHRITIS TYPE: ICD-10-CM

## 2025-01-14 PROBLEM — M17.9 OA (OSTEOARTHRITIS) OF KNEE: Status: ACTIVE | Noted: 2025-01-14

## 2025-01-14 RX ORDER — TRANEXAMIC ACID 10 MG/ML
1000 INJECTION, SOLUTION INTRAVENOUS ONCE
OUTPATIENT
Start: 2025-01-14 | End: 2025-01-14

## 2025-01-14 NOTE — PROGRESS NOTES
"Chief Complaint  Initial Evaluation of the Right Knee     Subjective      Rosaura Nelson presents to [facility] for initial evaluation of the right knee.  She is having pain in the right knee.  She has had pain off and on for years.  She noted the pain has increased the last few months.  She has had injections in the past, anti inflammatories and exercises.  She notes swelling of the knee.  She has difficulty with daily tasks and ADL's.      Allergies   Allergen Reactions    Latex Unknown - High Severity        Social History     Socioeconomic History    Marital status:    Tobacco Use    Smoking status: Never     Passive exposure: Past    Smokeless tobacco: Never   Vaping Use    Vaping status: Never Used   Substance and Sexual Activity    Alcohol use: No    Drug use: Never    Sexual activity: Yes     Partners: Male        I reviewed the patient's chief complaint, history of present illness, review of systems, past medical history, surgical history, family history, social history, medications, and allergy list.     Review of Systems     Constitutional: Denies fevers, chills, weight loss  Cardiovascular: Denies chest pain, shortness of breath  Skin: Denies rashes, acute skin changes  Neurologic: Denies headache, loss of consciousness        Vital Signs:   /68   Pulse 75   Ht 172.7 cm (68\")   Wt 67.9 kg (149 lb 9.6 oz)   SpO2 95%   BMI 22.75 kg/m²          Physical Exam  General: Alert. No acute distress    Ortho Exam        RIGHT KNEE Flexion 110. Extension -2. Stable to varus/valgus stress. Stable to anterior/posterior drawer. Neurovascularly intact. Negative Kuldeep. Negative Lachman. Positive EHL, FHL, HS and TA. Sensation intact to light touch all 5 nerves of the foot. Ambulates with Antalgic gait. Patella is well tracking. Calf supple, non-tender. Positive tenderness to the medial joint line. Positive tenderness to the lateral joint line. Positive Crepitus. " Good strength to hamstrings, quadriceps, dorsiflexors, and plantar flexors.  Knee Extensor Mechanism intact  Mild swelling.  Kellgren Zeferino grading scale is a 4.          Procedures      Imaging Results (Most Recent)       Procedure Component Value Units Date/Time    XR Knee 3 View Right [[id number]] Resulted: 01/14/25 1534     Updated: 01/14/25 1539             Result Review :     X-Ray Report:  Right knee X-Ray  Indication: Evaluation of the right knee  AP/Lateral and [place] view(s)  Findings: Advanced degenerative bone on bone arthritis.    Prior studies available for comparison: no        Assessment and Plan     Diagnoses and all orders for this visit:    1. Right knee pain, unspecified chronicity (Primary)  -     XR Knee 3 View Right    2. Osteoarthritis of right knee, unspecified osteoarthritis type        Discussed the treatment plan with the patient. I reviewed the X-rays that were obtained today with the patient.     Discussed the treatment options with the patient, operative vs non-operative. The patient expressed understanding and wished to proceed with a right total knee arthroplasty.        Discussed surgery., Risks/benefits discussed with patient including, but not limited to: infection, bleeding, neurovascular damage, re-rupture, aesthetic deformity, need for further surgery, and death., Discussed with patient the implant type being used during surgery and patient understands., Surgery pamphlet given., Call or return if worsening symptoms., DME order for a 3 in 1 given today due to patient will be confined to one room/level of the home that does not offer a toilet during postop recovery. , and Patient does not have any metal allergies.     Follow Up     2 weeks postoperatively       Patient was given instructions and counseling regarding her condition or for health maintenance advice. Please see specific information pulled into the AVS if appropriate.     Scribed for Hanh Nazario MD by Ev  ELIEZER Hill.  01/14/25   15:34 EST    I have personally performed the services described in this document as scribed by the above individual and it is both accurate and complete. Hanh Nazario MD 01/14/25

## 2025-02-18 DIAGNOSIS — Z47.1 AFTERCARE FOLLOWING RIGHT KNEE JOINT REPLACEMENT SURGERY: Primary | ICD-10-CM

## 2025-02-18 DIAGNOSIS — M17.11 OSTEOARTHRITIS OF RIGHT KNEE, UNSPECIFIED OSTEOARTHRITIS TYPE: Primary | ICD-10-CM

## 2025-02-18 DIAGNOSIS — Z96.651 AFTERCARE FOLLOWING RIGHT KNEE JOINT REPLACEMENT SURGERY: Primary | ICD-10-CM

## 2025-03-11 ENCOUNTER — PRE-ADMISSION TESTING (OUTPATIENT)
Dept: PREADMISSION TESTING | Facility: HOSPITAL | Age: 70
End: 2025-03-11
Payer: MEDICARE

## 2025-03-11 VITALS
DIASTOLIC BLOOD PRESSURE: 60 MMHG | WEIGHT: 146.83 LBS | RESPIRATION RATE: 16 BRPM | BODY MASS INDEX: 23.04 KG/M2 | TEMPERATURE: 98.7 F | OXYGEN SATURATION: 98 % | HEART RATE: 62 BPM | HEIGHT: 67 IN | SYSTOLIC BLOOD PRESSURE: 110 MMHG

## 2025-03-11 DIAGNOSIS — M17.11 OSTEOARTHRITIS OF RIGHT KNEE, UNSPECIFIED OSTEOARTHRITIS TYPE: ICD-10-CM

## 2025-03-11 DIAGNOSIS — Z01.818 ENCOUNTER FOR PREADMISSION TESTING: Primary | ICD-10-CM

## 2025-03-11 LAB
ALBUMIN SERPL-MCNC: 4.1 G/DL (ref 3.5–5.2)
ALBUMIN/GLOB SERPL: 1.6 G/DL
ALP SERPL-CCNC: 80 U/L (ref 39–117)
ALT SERPL W P-5'-P-CCNC: 9 U/L (ref 1–33)
ANION GAP SERPL CALCULATED.3IONS-SCNC: 9.2 MMOL/L (ref 5–15)
AST SERPL-CCNC: 13 U/L (ref 1–32)
BASOPHILS # BLD AUTO: 0.06 10*3/MM3 (ref 0–0.2)
BASOPHILS NFR BLD AUTO: 1.2 % (ref 0–1.5)
BILIRUB SERPL-MCNC: 0.3 MG/DL (ref 0–1.2)
BUN SERPL-MCNC: 15 MG/DL (ref 8–23)
BUN/CREAT SERPL: 20.8 (ref 7–25)
CALCIUM SPEC-SCNC: 9.1 MG/DL (ref 8.6–10.5)
CHLORIDE SERPL-SCNC: 106 MMOL/L (ref 98–107)
CO2 SERPL-SCNC: 24.8 MMOL/L (ref 22–29)
CREAT SERPL-MCNC: 0.72 MG/DL (ref 0.57–1)
DEPRECATED RDW RBC AUTO: 45.1 FL (ref 37–54)
EGFRCR SERPLBLD CKD-EPI 2021: 90.6 ML/MIN/1.73
EOSINOPHIL # BLD AUTO: 0.01 10*3/MM3 (ref 0–0.4)
EOSINOPHIL NFR BLD AUTO: 0.2 % (ref 0.3–6.2)
ERYTHROCYTE [DISTWIDTH] IN BLOOD BY AUTOMATED COUNT: 12.8 % (ref 12.3–15.4)
GLOBULIN UR ELPH-MCNC: 2.6 GM/DL
GLUCOSE SERPL-MCNC: 104 MG/DL (ref 65–99)
HBA1C MFR BLD: 5.1 % (ref 4.8–5.6)
HCT VFR BLD AUTO: 36.8 % (ref 34–46.6)
HGB BLD-MCNC: 12.6 G/DL (ref 12–15.9)
IMM GRANULOCYTES # BLD AUTO: 0.02 10*3/MM3 (ref 0–0.05)
IMM GRANULOCYTES NFR BLD AUTO: 0.4 % (ref 0–0.5)
LYMPHOCYTES # BLD AUTO: 1.29 10*3/MM3 (ref 0.7–3.1)
LYMPHOCYTES NFR BLD AUTO: 26.4 % (ref 19.6–45.3)
MCH RBC QN AUTO: 33.2 PG (ref 26.6–33)
MCHC RBC AUTO-ENTMCNC: 34.2 G/DL (ref 31.5–35.7)
MCV RBC AUTO: 97.1 FL (ref 79–97)
MONOCYTES # BLD AUTO: 0.39 10*3/MM3 (ref 0.1–0.9)
MONOCYTES NFR BLD AUTO: 8 % (ref 5–12)
NEUTROPHILS NFR BLD AUTO: 3.12 10*3/MM3 (ref 1.7–7)
NEUTROPHILS NFR BLD AUTO: 63.8 % (ref 42.7–76)
NRBC BLD AUTO-RTO: 0 /100 WBC (ref 0–0.2)
PLATELET # BLD AUTO: 239 10*3/MM3 (ref 140–450)
PMV BLD AUTO: 10.5 FL (ref 6–12)
POTASSIUM SERPL-SCNC: 4.4 MMOL/L (ref 3.5–5.2)
PROT SERPL-MCNC: 6.7 G/DL (ref 6–8.5)
RBC # BLD AUTO: 3.79 10*6/MM3 (ref 3.77–5.28)
SODIUM SERPL-SCNC: 140 MMOL/L (ref 136–145)
WBC NRBC COR # BLD AUTO: 4.89 10*3/MM3 (ref 3.4–10.8)

## 2025-03-11 PROCEDURE — 85025 COMPLETE CBC W/AUTO DIFF WBC: CPT

## 2025-03-11 PROCEDURE — 36415 COLL VENOUS BLD VENIPUNCTURE: CPT

## 2025-03-11 PROCEDURE — 83036 HEMOGLOBIN GLYCOSYLATED A1C: CPT

## 2025-03-11 PROCEDURE — 80053 COMPREHEN METABOLIC PANEL: CPT

## 2025-03-11 PROCEDURE — 93005 ELECTROCARDIOGRAM TRACING: CPT

## 2025-03-11 RX ORDER — VIBEGRON 75 MG/1
1 TABLET, FILM COATED ORAL DAILY
Status: ON HOLD | COMMUNITY
Start: 2025-02-24

## 2025-03-11 RX ORDER — OMEPRAZOLE 40 MG/1
1 CAPSULE, DELAYED RELEASE ORAL DAILY
Status: ON HOLD | COMMUNITY
Start: 2025-02-12

## 2025-03-11 NOTE — SIGNIFICANT NOTE
PT GOAL IS TO HAVE LESS PAIN,  PT USING [facility] FOR THERAPY.  PT'S DAUGHTER TO BE SUPPORT WITH AFTERCARE.

## 2025-03-11 NOTE — DISCHARGE INSTRUCTIONS
IMPORTANT INSTRUCTIONS - PRE-ADMISSION TESTING  DO NOT EAT OR CHEW anything after midnight the night before your procedure.    You may have CLEAR liquids up to 3  hours prior to ARRIVAL time.   Take the following medications the morning of your procedure with JUST A SIP OF WATER:    METOPROLOL, COLESTIPOL, ESTRADIOL OMEPRAZOLE, GEMTESA     DO NOT BRING your medications to the hospital with you, UNLESS something has changed since your PRE-Admission Testing appointment.  Hold all vitamins, supplements, and NSAIDS (Non- steroidal anti-inflammatory meds) for one week prior to surgery (you MAY take Tylenol or Acetaminophen).  If you are diabetic, check your blood sugar the morning of your procedure. If it is less than 70 or if you are feeling symptomatic, call the following number for further instructions: [phone].   Make sure you have a ride home and have someone who will stay with you the day of your procedure after you go home.  If you have any questions, please call your Pre-Admission Testing Nurse, CARROLL RACHEL R.N.  at [phone].   Per anesthesia request, do not smoke for 24 hours before your procedure or as instructed by your surgeon.    YOU WILL RECEIVE AN ARRIVAL TIME CALL ON FRIDAY  3/14/25.     Clear Liquid Diet        Find out when you need to start a clear liquid diet.   Think of “clear liquids” as anything you could read a newspaper through. This includes things like water, broth, sports drinks, or tea WITHOUT any kind of milk or cream.           Once you are told to start a clear liquid diet, only drink these things until 2 hours before arrival to the hospital or when the hospital says to stop. Total volume limitation: 8 oz.       Clear liquids you CAN drink:   Water   Clear broth: beef, chicken, vegetable, or bone broth with nothing in it   Gatorade   Lemonade or Derek-aid   Soda   Tea, coffee (NO cream or honey)   Jell-O (without fruit)   Popsicles (without fruit or cream)   Italian ices   Juice  without pulp: apple, white, grape   You may use salt, pepper, and sugar  NO RED LIQUIDS  DRINK A 20 OZ BOTTLE OF G2 GATORADE OR ZERO 3 HOURS PRIOR TO ARRIVAL AM OF SURGERY UNLESS YOU ARE AN EARLY ARRIVAL TIME. DRINK BEFORE GOING TO BED IF EARLY ARRIVAL TIME.     Do NOT drink:   Milk or cream   Soy milk, almond milk, coconut milk, or other non-dairy drinks and   creamers   Milkshakes or smoothies   Tomato juice   Orange juice   Grapefruit juice   Cream soups or any other than broth         Clear Liquid Diet:  Do NOT eat any solid food.  Do NOT eat or suck on mints or candy.  Do NOT chew gum.  Do NOT drink thick liquids like milk or juice with pulp in it.  Do NOT add milk, cream, or anything like soy milk or almond milk to coffee or tea.       PREOPERATIVE (BEFORE SURGERY)              BATHING INSTRUCTIONS  Instructions:    You will need to shower  separate times utilizing the soap provided; at the times indicated   below:     3/15/25 SATURDAY NIGHT   3/16/25 ANISHA MORNING  3/16/25 ANISHA NIGHT      Wash your hair and face with normal shampoo and soap, rinse it well before using the surgical soap.      In the shower, wet the skin completely with water from your neck to your feet. Apply the cleanser to your   body ONLY FROM THE NECK TO YOUR FEET.     Do NOT USE THE CLEANSER ON YOUR FACE, HEAD, OR GENITAL (PRIVATE) AREAS.   Keep it out of your eyes, ears, and mouth because of the risk of injury to those areas.      Scrub with a clean washcloth for each bath utilizing the soap provided from the top of your body to the   bottom starting at the neck area.      Pay close attention to your armpits, groin area, and the site of surgery.      Wash your body gently for 5 minutes. Stand outside the stream or turn off the water while scrubbing your   body. Do NOT wash with your regular soap after the surgical cleanser is used.      RINSE THE CLEANSER OFF COMPLETELY with plenty of water. Rinse the area again thoroughly.      Dry  off with a clean towel. The surgical soap can cause dryness; however do NOT APPLY LOTION,   CREAM, POWDER, and/or DEODORANT AFTER SHOWERING.     Be sure to where clean clothes after showering.      Ensure CLEAN BED LINENS AFTER FIRST wash with the surgical soap.      NO PETS ALLOWED IN THE BED with you after utilizing the surgical soap.

## 2025-03-12 DIAGNOSIS — Z96.651 AFTERCARE FOLLOWING RIGHT KNEE JOINT REPLACEMENT SURGERY: Primary | ICD-10-CM

## 2025-03-12 DIAGNOSIS — Z47.1 AFTERCARE FOLLOWING RIGHT KNEE JOINT REPLACEMENT SURGERY: Primary | ICD-10-CM

## 2025-03-15 LAB
QT INTERVAL: 405 MS
QTC INTERVAL: 411 MS

## 2025-03-15 NOTE — H&P
Chief Complaint  No chief complaint on file.     Fer Nelson presents to [facility] for initial evaluation of the right knee.  She is having pain in the right knee.  She has had pain off and on for years.  She noted the pain has increased the last few months.  She has had injections in the past, anti inflammatories and exercises.  She notes swelling of the knee.  She has difficulty with daily tasks and ADL's.      Allergies   Allergen Reactions    Latex Itching, Rash and Unknown - High Severity        Social History     Socioeconomic History    Marital status:    Tobacco Use    Smoking status: Never     Passive exposure: Past    Smokeless tobacco: Never   Vaping Use    Vaping status: Never Used   Substance and Sexual Activity    Alcohol use: No    Drug use: Never    Sexual activity: Defer     Partners: Male        I reviewed the patient's chief complaint, history of present illness, review of systems, past medical history, surgical history, family history, social history, medications, and allergy list.     Review of Systems     Constitutional: Denies fevers, chills, weight loss  Cardiovascular: Denies chest pain, shortness of breath  Skin: Denies rashes, acute skin changes  Neurologic: Denies headache, loss of consciousness        Vital Signs:   There were no vitals taken for this visit.         Physical Exam  General: Alert. No acute distress    Ortho Exam        RIGHT KNEE Flexion 110. Extension -2. Stable to varus/valgus stress. Stable to anterior/posterior drawer. Neurovascularly intact. Negative Kuldeep. Negative Lachman. Positive EHL, FHL, HS and TA. Sensation intact to light touch all 5 nerves of the foot. Ambulates with Antalgic gait. Patella is well tracking. Calf supple, non-tender. Positive tenderness to the medial joint line. Positive tenderness to the lateral joint line. Positive Crepitus. Good strength to hamstrings, quadriceps, dorsiflexors, and  plantar flexors.  Knee Extensor Mechanism intact  Mild swelling.  Kellgren Zeferino grading scale is a 4.          Procedures      Imaging Results (Most Recent)       None             Result Review :     X-Ray Report:  Right knee X-Ray  Indication: Evaluation of the right knee  AP/Lateral and [place] view(s)  Findings: Advanced degenerative bone on bone arthritis.    Prior studies available for comparison: no        Assessment and Plan     There are no diagnoses linked to this encounter.      Discussed the treatment plan with the patient. I reviewed the X-rays that were obtained today with the patient.     Discussed the treatment options with the patient, operative vs non-operative. The patient expressed understanding and wished to proceed with a right total knee arthroplasty.        Discussed surgery., Risks/benefits discussed with patient including, but not limited to: infection, bleeding, neurovascular damage, re-rupture, aesthetic deformity, need for further surgery, and death., Discussed with patient the implant type being used during surgery and patient understands., Surgery pamphlet given., Call or return if worsening symptoms., DME order for a 3 in 1 given today due to patient will be confined to one room/level of the home that does not offer a toilet during postop recovery. , and Patient does not have any metal allergies.     Follow Up     2 weeks postoperatively       I have personally performed the services described in this document as scribed by the above individual and it is both accurate and complete. Hanh Nazario MD 03/15/25

## 2025-03-17 ENCOUNTER — ANESTHESIA (OUTPATIENT)
Dept: PERIOP | Facility: HOSPITAL | Age: 70
End: 2025-03-17
Payer: MEDICARE

## 2025-03-17 ENCOUNTER — ANESTHESIA EVENT CONVERTED (OUTPATIENT)
Dept: ANESTHESIOLOGY | Facility: HOSPITAL | Age: 70
End: 2025-03-17
Payer: MEDICARE

## 2025-03-17 ENCOUNTER — ANESTHESIA EVENT (OUTPATIENT)
Dept: PERIOP | Facility: HOSPITAL | Age: 70
End: 2025-03-17
Payer: MEDICARE

## 2025-03-17 ENCOUNTER — HOSPITAL ENCOUNTER (OUTPATIENT)
Facility: HOSPITAL | Age: 70
Discharge: HOME | End: 2025-03-18
Attending: ORTHOPAEDIC SURGERY | Admitting: ORTHOPAEDIC SURGERY
Payer: MEDICARE

## 2025-03-17 ENCOUNTER — APPOINTMENT (OUTPATIENT)
Dept: GENERAL RADIOLOGY | Facility: HOSPITAL | Age: 70
End: 2025-03-17
Payer: MEDICARE

## 2025-03-17 DIAGNOSIS — M17.11 OSTEOARTHRITIS OF RIGHT KNEE, UNSPECIFIED OSTEOARTHRITIS TYPE: ICD-10-CM

## 2025-03-17 DIAGNOSIS — R26.2 DIFFICULTY IN WALKING: Primary | ICD-10-CM

## 2025-03-17 LAB
GLUCOSE BLDC GLUCOMTR-MCNC: 103 MG/DL (ref 70–99)
GLUCOSE BLDC GLUCOMTR-MCNC: 151 MG/DL (ref 70–99)
GLUCOSE BLDC GLUCOMTR-MCNC: 185 MG/DL (ref 70–99)
GLUCOSE BLDC GLUCOMTR-MCNC: 196 MG/DL (ref 70–99)

## 2025-03-17 PROCEDURE — 25010000002 PROPOFOL 10 MG/ML EMULSION

## 2025-03-17 PROCEDURE — 25010000002 SUGAMMADEX 200 MG/2ML SOLUTION

## 2025-03-17 PROCEDURE — 25810000003 LACTATED RINGERS PER 1000 ML: Performed by: ANESTHESIOLOGY

## 2025-03-17 PROCEDURE — 63710000001 INSULIN LISPRO (HUMAN) PER 5 UNITS: Performed by: STUDENT IN AN ORGANIZED HEALTH CARE EDUCATION/TRAINING PROGRAM

## 2025-03-17 PROCEDURE — 25810000003 LACTATED RINGERS PER 1000 ML

## 2025-03-17 PROCEDURE — 97161 PT EVAL LOW COMPLEX 20 MIN: CPT

## 2025-03-17 PROCEDURE — 25010000002 PROCHLORPERAZINE 10 MG/2ML SOLUTION: Performed by: INTERNAL MEDICINE

## 2025-03-17 PROCEDURE — 25010000002 KETOROLAC TROMETHAMINE PER 15 MG: Performed by: ORTHOPAEDIC SURGERY

## 2025-03-17 PROCEDURE — 25010000002 CEFAZOLIN PER 500 MG: Performed by: ORTHOPAEDIC SURGERY

## 2025-03-17 PROCEDURE — 25010000002 GLYCOPYRROLATE 0.2 MG/ML SOLUTION

## 2025-03-17 PROCEDURE — 25010000002 BUPIVACAINE (PF) 0.5 % SOLUTION: Performed by: ANESTHESIOLOGY

## 2025-03-17 PROCEDURE — 25010000002 HYDROMORPHONE PER 4 MG: Performed by: ORTHOPAEDIC SURGERY

## 2025-03-17 PROCEDURE — 82948 REAGENT STRIP/BLOOD GLUCOSE: CPT

## 2025-03-17 PROCEDURE — C1776 JOINT DEVICE (IMPLANTABLE): HCPCS | Performed by: ORTHOPAEDIC SURGERY

## 2025-03-17 PROCEDURE — 73560 X-RAY EXAM OF KNEE 1 OR 2: CPT

## 2025-03-17 PROCEDURE — 99204 OFFICE O/P NEW MOD 45 MIN: CPT | Performed by: STUDENT IN AN ORGANIZED HEALTH CARE EDUCATION/TRAINING PROGRAM

## 2025-03-17 PROCEDURE — 25010000002 DEXAMETHASONE PER 1 MG

## 2025-03-17 PROCEDURE — 25010000002 ONDANSETRON PER 1 MG: Performed by: ORTHOPAEDIC SURGERY

## 2025-03-17 PROCEDURE — 25010000002 FENTANYL CITRATE (PF) 50 MCG/ML SOLUTION

## 2025-03-17 PROCEDURE — 25010000002 HYDROMORPHONE 1 MG/ML SOLUTION

## 2025-03-17 PROCEDURE — 25010000002 MIDAZOLAM PER 1MG: Performed by: ANESTHESIOLOGY

## 2025-03-17 PROCEDURE — 25010000002 ROPIVACAINE PER 1 MG: Performed by: ORTHOPAEDIC SURGERY

## 2025-03-17 PROCEDURE — 25010000002 EPINEPHRINE 1 MG/ML SOLUTION: Performed by: ORTHOPAEDIC SURGERY

## 2025-03-17 PROCEDURE — C1713 ANCHOR/SCREW BN/BN,TIS/BN: HCPCS | Performed by: ORTHOPAEDIC SURGERY

## 2025-03-17 PROCEDURE — 25010000002 LIDOCAINE PF 2% 2 % SOLUTION

## 2025-03-17 PROCEDURE — 27447 TOTAL KNEE ARTHROPLASTY: CPT | Performed by: ORTHOPAEDIC SURGERY

## 2025-03-17 PROCEDURE — 25010000002 ONDANSETRON PER 1 MG

## 2025-03-17 DEVICE — PAT ARCOM W/WIRE 3PEG 31MM SM: Type: IMPLANTABLE DEVICE | Site: KNEE | Status: FUNCTIONAL

## 2025-03-17 DEVICE — BEAR TIB/KN VANGUARD AS 12X71MM NS: Type: IMPLANTABLE DEVICE | Site: KNEE | Status: FUNCTIONAL

## 2025-03-17 DEVICE — COMP FEM/KN VANGUARD INTLK CR 67.5MM NS RT: Type: IMPLANTABLE DEVICE | Site: KNEE | Status: FUNCTIONAL

## 2025-03-17 DEVICE — CMT BONE PALACOS R HI/VISC 1X40: Type: IMPLANTABLE DEVICE | Site: KNEE | Status: FUNCTIONAL

## 2025-03-17 DEVICE — TRY TIB CRUC 71MM: Type: IMPLANTABLE DEVICE | Site: KNEE | Status: FUNCTIONAL

## 2025-03-17 DEVICE — CAP TOTL KN CMT PRIMARY: Type: IMPLANTABLE DEVICE | Site: KNEE | Status: FUNCTIONAL

## 2025-03-17 RX ORDER — PHENYLEPHRINE HCL IN 0.9% NACL 1 MG/10 ML
SYRINGE (ML) INTRAVENOUS AS NEEDED
Status: DISCONTINUED | OUTPATIENT
Start: 2025-03-17 | End: 2025-03-17 | Stop reason: SURG

## 2025-03-17 RX ORDER — HYDROCODONE BITARTRATE AND ACETAMINOPHEN 7.5; 325 MG/1; MG/1
1 TABLET ORAL EVERY 4 HOURS PRN
Status: DISCONTINUED | OUTPATIENT
Start: 2025-03-17 | End: 2025-03-18 | Stop reason: HOSPADM

## 2025-03-17 RX ORDER — FERROUS SULFATE 325(65) MG
325 TABLET ORAL
Status: DISCONTINUED | OUTPATIENT
Start: 2025-03-18 | End: 2025-03-18 | Stop reason: HOSPADM

## 2025-03-17 RX ORDER — DICYCLOMINE HCL 20 MG
20 TABLET ORAL EVERY 6 HOURS
Status: DISCONTINUED | OUTPATIENT
Start: 2025-03-17 | End: 2025-03-18 | Stop reason: HOSPADM

## 2025-03-17 RX ORDER — ONDANSETRON 2 MG/ML
INJECTION INTRAMUSCULAR; INTRAVENOUS AS NEEDED
Status: DISCONTINUED | OUTPATIENT
Start: 2025-03-17 | End: 2025-03-17 | Stop reason: SURG

## 2025-03-17 RX ORDER — ONDANSETRON 2 MG/ML
4 INJECTION INTRAMUSCULAR; INTRAVENOUS EVERY 6 HOURS PRN
Status: DISCONTINUED | OUTPATIENT
Start: 2025-03-17 | End: 2025-03-18 | Stop reason: HOSPADM

## 2025-03-17 RX ORDER — ACETAMINOPHEN 500 MG
1000 TABLET ORAL EVERY 8 HOURS
Status: DISCONTINUED | OUTPATIENT
Start: 2025-03-17 | End: 2025-03-18 | Stop reason: HOSPADM

## 2025-03-17 RX ORDER — OXYBUTYNIN CHLORIDE 5 MG/1
10 TABLET, EXTENDED RELEASE ORAL DAILY
Status: DISCONTINUED | OUTPATIENT
Start: 2025-03-18 | End: 2025-03-18 | Stop reason: HOSPADM

## 2025-03-17 RX ORDER — PROPOFOL 10 MG/ML
VIAL (ML) INTRAVENOUS AS NEEDED
Status: DISCONTINUED | OUTPATIENT
Start: 2025-03-17 | End: 2025-03-17 | Stop reason: SURG

## 2025-03-17 RX ORDER — BUPIVACAINE HYDROCHLORIDE 5 MG/ML
INJECTION, SOLUTION EPIDURAL; INTRACAUDAL
Status: COMPLETED | OUTPATIENT
Start: 2025-03-17 | End: 2025-03-17

## 2025-03-17 RX ORDER — SODIUM CHLORIDE, SODIUM LACTATE, POTASSIUM CHLORIDE, CALCIUM CHLORIDE 600; 310; 30; 20 MG/100ML; MG/100ML; MG/100ML; MG/100ML
100 INJECTION, SOLUTION INTRAVENOUS CONTINUOUS
Status: ACTIVE | OUTPATIENT
Start: 2025-03-17 | End: 2025-03-17

## 2025-03-17 RX ORDER — OXYCODONE HYDROCHLORIDE 5 MG/1
5 TABLET ORAL
Status: DISCONTINUED | OUTPATIENT
Start: 2025-03-17 | End: 2025-03-17 | Stop reason: HOSPADM

## 2025-03-17 RX ORDER — METHENAMINE HIPPURATE 1000 MG/1
1 TABLET ORAL 2 TIMES DAILY WITH MEALS
COMMUNITY

## 2025-03-17 RX ORDER — OXYBUTYNIN CHLORIDE 5 MG/1
5 TABLET, EXTENDED RELEASE ORAL DAILY
Status: ON HOLD | COMMUNITY
End: 2025-03-17 | Stop reason: ALTCHOICE

## 2025-03-17 RX ORDER — FENTANYL CITRATE 50 UG/ML
INJECTION, SOLUTION INTRAMUSCULAR; INTRAVENOUS AS NEEDED
Status: DISCONTINUED | OUTPATIENT
Start: 2025-03-17 | End: 2025-03-17 | Stop reason: SURG

## 2025-03-17 RX ORDER — SUCRALFATE 1 G/1
1 TABLET ORAL EVERY 12 HOURS SCHEDULED
COMMUNITY
Start: 2025-03-12

## 2025-03-17 RX ORDER — LEVOTHYROXINE SODIUM 50 UG/1
50 TABLET ORAL
Status: DISCONTINUED | OUTPATIENT
Start: 2025-03-18 | End: 2025-03-18 | Stop reason: HOSPADM

## 2025-03-17 RX ORDER — ENOXAPARIN SODIUM 100 MG/ML
40 INJECTION SUBCUTANEOUS DAILY
Status: DISCONTINUED | OUTPATIENT
Start: 2025-03-18 | End: 2025-03-18 | Stop reason: HOSPADM

## 2025-03-17 RX ORDER — SODIUM CHLORIDE 0.9 % (FLUSH) 0.9 %
10 SYRINGE (ML) INJECTION AS NEEDED
Status: DISCONTINUED | OUTPATIENT
Start: 2025-03-17 | End: 2025-03-17 | Stop reason: HOSPADM

## 2025-03-17 RX ORDER — FAMOTIDINE 20 MG/1
40 TABLET, FILM COATED ORAL DAILY
Status: DISCONTINUED | OUTPATIENT
Start: 2025-03-17 | End: 2025-03-18 | Stop reason: HOSPADM

## 2025-03-17 RX ORDER — FAMOTIDINE 40 MG/1
40 TABLET, FILM COATED ORAL
COMMUNITY
Start: 2025-03-12

## 2025-03-17 RX ORDER — MAGNESIUM HYDROXIDE 1200 MG/15ML
LIQUID ORAL AS NEEDED
Status: DISCONTINUED | OUTPATIENT
Start: 2025-03-17 | End: 2025-03-17 | Stop reason: HOSPADM

## 2025-03-17 RX ORDER — KETOROLAC TROMETHAMINE 15 MG/ML
15 INJECTION, SOLUTION INTRAMUSCULAR; INTRAVENOUS EVERY 6 HOURS SCHEDULED
Status: COMPLETED | OUTPATIENT
Start: 2025-03-17 | End: 2025-03-18

## 2025-03-17 RX ORDER — INSULIN LISPRO 100 [IU]/ML
2-9 INJECTION, SOLUTION INTRAVENOUS; SUBCUTANEOUS
Status: DISCONTINUED | OUTPATIENT
Start: 2025-03-17 | End: 2025-03-18 | Stop reason: HOSPADM

## 2025-03-17 RX ORDER — ATORVASTATIN CALCIUM 10 MG/1
10 TABLET, FILM COATED ORAL NIGHTLY
Status: DISCONTINUED | OUTPATIENT
Start: 2025-03-17 | End: 2025-03-18 | Stop reason: HOSPADM

## 2025-03-17 RX ORDER — ONDANSETRON 4 MG/1
4 TABLET, ORALLY DISINTEGRATING ORAL EVERY 6 HOURS PRN
Status: DISCONTINUED | OUTPATIENT
Start: 2025-03-17 | End: 2025-03-18 | Stop reason: HOSPADM

## 2025-03-17 RX ORDER — PROCHLORPERAZINE EDISYLATE 5 MG/ML
5 INJECTION INTRAMUSCULAR; INTRAVENOUS EVERY 6 HOURS PRN
Status: DISCONTINUED | OUTPATIENT
Start: 2025-03-17 | End: 2025-03-18 | Stop reason: HOSPADM

## 2025-03-17 RX ORDER — HYDROCODONE BITARTRATE AND ACETAMINOPHEN 7.5; 325 MG/1; MG/1
2 TABLET ORAL EVERY 4 HOURS PRN
Status: DISCONTINUED | OUTPATIENT
Start: 2025-03-17 | End: 2025-03-18 | Stop reason: HOSPADM

## 2025-03-17 RX ORDER — ONDANSETRON 2 MG/ML
4 INJECTION INTRAMUSCULAR; INTRAVENOUS ONCE AS NEEDED
Status: DISCONTINUED | OUTPATIENT
Start: 2025-03-17 | End: 2025-03-17 | Stop reason: HOSPADM

## 2025-03-17 RX ORDER — LIDOCAINE HYDROCHLORIDE 20 MG/ML
INJECTION, SOLUTION EPIDURAL; INFILTRATION; INTRACAUDAL; PERINEURAL AS NEEDED
Status: DISCONTINUED | OUTPATIENT
Start: 2025-03-17 | End: 2025-03-17 | Stop reason: SURG

## 2025-03-17 RX ORDER — CHOLESTYRAMINE LIGHT 4 G/5.7G
1 POWDER, FOR SUSPENSION ORAL DAILY
Status: DISCONTINUED | OUTPATIENT
Start: 2025-03-17 | End: 2025-03-18 | Stop reason: HOSPADM

## 2025-03-17 RX ORDER — IBUPROFEN 600 MG/1
1 TABLET ORAL
Status: DISCONTINUED | OUTPATIENT
Start: 2025-03-17 | End: 2025-03-18 | Stop reason: HOSPADM

## 2025-03-17 RX ORDER — CHOLECALCIFEROL (VITAMIN D3) 25 MCG
2000 TABLET ORAL DAILY
Status: DISCONTINUED | OUTPATIENT
Start: 2025-03-18 | End: 2025-03-18 | Stop reason: HOSPADM

## 2025-03-17 RX ORDER — NICOTINE POLACRILEX 4 MG
15 LOZENGE BUCCAL
Status: DISCONTINUED | OUTPATIENT
Start: 2025-03-17 | End: 2025-03-18 | Stop reason: HOSPADM

## 2025-03-17 RX ORDER — MIDAZOLAM HYDROCHLORIDE 2 MG/2ML
2 INJECTION, SOLUTION INTRAMUSCULAR; INTRAVENOUS ONCE
Status: COMPLETED | OUTPATIENT
Start: 2025-03-17 | End: 2025-03-17

## 2025-03-17 RX ORDER — SODIUM CHLORIDE, SODIUM LACTATE, POTASSIUM CHLORIDE, CALCIUM CHLORIDE 600; 310; 30; 20 MG/100ML; MG/100ML; MG/100ML; MG/100ML
INJECTION, SOLUTION INTRAVENOUS CONTINUOUS PRN
Status: DISCONTINUED | OUTPATIENT
Start: 2025-03-17 | End: 2025-03-17 | Stop reason: SURG

## 2025-03-17 RX ORDER — TRANEXAMIC ACID 10 MG/ML
1000 INJECTION, SOLUTION INTRAVENOUS ONCE
Status: COMPLETED | OUTPATIENT
Start: 2025-03-17 | End: 2025-03-17

## 2025-03-17 RX ORDER — SODIUM CHLORIDE, SODIUM LACTATE, POTASSIUM CHLORIDE, CALCIUM CHLORIDE 600; 310; 30; 20 MG/100ML; MG/100ML; MG/100ML; MG/100ML
100 INJECTION, SOLUTION INTRAVENOUS CONTINUOUS PRN
Status: DISCONTINUED | OUTPATIENT
Start: 2025-03-17 | End: 2025-03-17 | Stop reason: HOSPADM

## 2025-03-17 RX ORDER — GLYCOPYRROLATE 0.2 MG/ML
INJECTION INTRAMUSCULAR; INTRAVENOUS AS NEEDED
Status: DISCONTINUED | OUTPATIENT
Start: 2025-03-17 | End: 2025-03-17 | Stop reason: SURG

## 2025-03-17 RX ORDER — METOPROLOL TARTRATE 50 MG
50 TABLET ORAL EVERY 12 HOURS SCHEDULED
Status: DISCONTINUED | OUTPATIENT
Start: 2025-03-17 | End: 2025-03-18 | Stop reason: HOSPADM

## 2025-03-17 RX ORDER — AMOXICILLIN 250 MG
2 CAPSULE ORAL 2 TIMES DAILY
Status: DISCONTINUED | OUTPATIENT
Start: 2025-03-17 | End: 2025-03-18 | Stop reason: HOSPADM

## 2025-03-17 RX ORDER — CELECOXIB 100 MG/1
200 CAPSULE ORAL ONCE
Status: COMPLETED | OUTPATIENT
Start: 2025-03-17 | End: 2025-03-17

## 2025-03-17 RX ORDER — ROCURONIUM BROMIDE 10 MG/ML
INJECTION, SOLUTION INTRAVENOUS AS NEEDED
Status: DISCONTINUED | OUTPATIENT
Start: 2025-03-17 | End: 2025-03-17 | Stop reason: SURG

## 2025-03-17 RX ORDER — DEXTROSE MONOHYDRATE 25 G/50ML
25 INJECTION, SOLUTION INTRAVENOUS
Status: DISCONTINUED | OUTPATIENT
Start: 2025-03-17 | End: 2025-03-18 | Stop reason: HOSPADM

## 2025-03-17 RX ORDER — NALOXONE HCL 0.4 MG/ML
0.4 VIAL (ML) INJECTION
Status: DISCONTINUED | OUTPATIENT
Start: 2025-03-17 | End: 2025-03-18 | Stop reason: HOSPADM

## 2025-03-17 RX ORDER — DEXAMETHASONE SODIUM PHOSPHATE 4 MG/ML
INJECTION, SOLUTION INTRA-ARTICULAR; INTRALESIONAL; INTRAMUSCULAR; INTRAVENOUS; SOFT TISSUE AS NEEDED
Status: DISCONTINUED | OUTPATIENT
Start: 2025-03-17 | End: 2025-03-17 | Stop reason: SURG

## 2025-03-17 RX ORDER — ESTRADIOL 0.5 MG/1
0.5 TABLET ORAL DAILY
Status: DISCONTINUED | OUTPATIENT
Start: 2025-03-18 | End: 2025-03-18 | Stop reason: HOSPADM

## 2025-03-17 RX ORDER — SODIUM CHLORIDE 9 MG/ML
40 INJECTION, SOLUTION INTRAVENOUS AS NEEDED
Status: DISCONTINUED | OUTPATIENT
Start: 2025-03-17 | End: 2025-03-17 | Stop reason: HOSPADM

## 2025-03-17 RX ORDER — ACETAMINOPHEN 500 MG
1000 TABLET ORAL ONCE
Status: COMPLETED | OUTPATIENT
Start: 2025-03-17 | End: 2025-03-17

## 2025-03-17 RX ADMIN — GLYCOPYRROLATE 0.2 MG: 0.2 INJECTION INTRAMUSCULAR; INTRAVENOUS at 11:46

## 2025-03-17 RX ADMIN — DEXAMETHASONE SODIUM PHOSPHATE 4 MG: 4 INJECTION, SOLUTION INTRAMUSCULAR; INTRAVENOUS at 11:56

## 2025-03-17 RX ADMIN — FENTANYL CITRATE 50 MCG: 50 INJECTION, SOLUTION INTRAMUSCULAR; INTRAVENOUS at 11:44

## 2025-03-17 RX ADMIN — CHOLESTYRAMINE 4 G: 4 POWDER, FOR SUSPENSION ORAL at 19:30

## 2025-03-17 RX ADMIN — Medication 100 MCG: at 13:02

## 2025-03-17 RX ADMIN — ONDANSETRON 4 MG: 2 INJECTION INTRAMUSCULAR; INTRAVENOUS at 12:44

## 2025-03-17 RX ADMIN — HYDROMORPHONE HYDROCHLORIDE 0.5 MG: 1 INJECTION, SOLUTION INTRAMUSCULAR; INTRAVENOUS; SUBCUTANEOUS at 12:17

## 2025-03-17 RX ADMIN — SODIUM CHLORIDE, SODIUM LACTATE, POTASSIUM CHLORIDE, AND CALCIUM CHLORIDE 100 ML/HR: .6; .31; .03; .02 INJECTION, SOLUTION INTRAVENOUS at 09:30

## 2025-03-17 RX ADMIN — KETOROLAC TROMETHAMINE 15 MG: 15 INJECTION, SOLUTION INTRAMUSCULAR; INTRAVENOUS at 19:29

## 2025-03-17 RX ADMIN — LIDOCAINE HYDROCHLORIDE 80 MG: 20 INJECTION, SOLUTION EPIDURAL; INFILTRATION; INTRACAUDAL; PERINEURAL at 11:44

## 2025-03-17 RX ADMIN — TRANEXAMIC ACID 1000 MG: 10 INJECTION, SOLUTION INTRAVENOUS at 10:45

## 2025-03-17 RX ADMIN — PROPOFOL 100 MG: 10 INJECTION, EMULSION INTRAVENOUS at 11:44

## 2025-03-17 RX ADMIN — HYDROCODONE BITARTRATE AND ACETAMINOPHEN 2 TABLET: 7.5; 325 TABLET ORAL at 15:36

## 2025-03-17 RX ADMIN — ACETAMINOPHEN 1000 MG: 500 TABLET ORAL at 09:31

## 2025-03-17 RX ADMIN — DICYCLOMINE HYDROCHLORIDE 20 MG: 20 TABLET ORAL at 19:30

## 2025-03-17 RX ADMIN — DICYCLOMINE HYDROCHLORIDE 20 MG: 20 TABLET ORAL at 23:07

## 2025-03-17 RX ADMIN — SUGAMMADEX 200 MG: 100 INJECTION, SOLUTION INTRAVENOUS at 13:09

## 2025-03-17 RX ADMIN — INSULIN LISPRO 2 UNITS: 100 INJECTION, SOLUTION INTRAVENOUS; SUBCUTANEOUS at 20:14

## 2025-03-17 RX ADMIN — SENNOSIDES AND DOCUSATE SODIUM 2 TABLET: 50; 8.6 TABLET ORAL at 20:05

## 2025-03-17 RX ADMIN — ATORVASTATIN CALCIUM 10 MG: 10 TABLET, FILM COATED ORAL at 20:05

## 2025-03-17 RX ADMIN — METOPROLOL TARTRATE 50 MG: 50 TABLET, FILM COATED ORAL at 20:05

## 2025-03-17 RX ADMIN — CELECOXIB 200 MG: 100 CAPSULE ORAL at 09:31

## 2025-03-17 RX ADMIN — SODIUM CHLORIDE, POTASSIUM CHLORIDE, SODIUM LACTATE AND CALCIUM CHLORIDE: 600; 310; 30; 20 INJECTION, SOLUTION INTRAVENOUS at 12:18

## 2025-03-17 RX ADMIN — FAMOTIDINE 40 MG: 20 TABLET, FILM COATED ORAL at 14:47

## 2025-03-17 RX ADMIN — TRANEXAMIC ACID 1000 MG: 10 INJECTION, SOLUTION INTRAVENOUS at 12:42

## 2025-03-17 RX ADMIN — ROCURONIUM BROMIDE 20 MG: 10 INJECTION, SOLUTION INTRAVENOUS at 12:10

## 2025-03-17 RX ADMIN — PROCHLORPERAZINE EDISYLATE 5 MG: 5 INJECTION INTRAMUSCULAR; INTRAVENOUS at 19:36

## 2025-03-17 RX ADMIN — PROPOFOL 50 MG: 10 INJECTION, EMULSION INTRAVENOUS at 11:45

## 2025-03-17 RX ADMIN — ONDANSETRON 4 MG: 2 INJECTION INTRAMUSCULAR; INTRAVENOUS at 15:27

## 2025-03-17 RX ADMIN — SODIUM CHLORIDE 2 G: 9 INJECTION, SOLUTION INTRAVENOUS at 11:50

## 2025-03-17 RX ADMIN — FENTANYL CITRATE 50 MCG: 50 INJECTION, SOLUTION INTRAMUSCULAR; INTRAVENOUS at 12:07

## 2025-03-17 RX ADMIN — SODIUM CHLORIDE, POTASSIUM CHLORIDE, SODIUM LACTATE AND CALCIUM CHLORIDE: 600; 310; 30; 20 INJECTION, SOLUTION INTRAVENOUS at 11:40

## 2025-03-17 RX ADMIN — Medication 100 MCG: at 12:07

## 2025-03-17 RX ADMIN — KETOROLAC TROMETHAMINE 15 MG: 15 INJECTION, SOLUTION INTRAMUSCULAR; INTRAVENOUS at 23:07

## 2025-03-17 RX ADMIN — MIDAZOLAM HYDROCHLORIDE 2 MG: 1 INJECTION, SOLUTION INTRAMUSCULAR; INTRAVENOUS at 10:45

## 2025-03-17 RX ADMIN — SODIUM CHLORIDE 2000 MG: 9 INJECTION, SOLUTION INTRAVENOUS at 19:21

## 2025-03-17 RX ADMIN — ROCURONIUM BROMIDE 50 MG: 10 INJECTION, SOLUTION INTRAVENOUS at 11:45

## 2025-03-17 RX ADMIN — BUPIVACAINE HYDROCHLORIDE 30 ML: 5 INJECTION, SOLUTION EPIDURAL; INTRACAUDAL; PERINEURAL at 10:56

## 2025-03-17 NOTE — ANESTHESIA PREPROCEDURE EVALUATION
Anesthesia Evaluation     Patient summary reviewed and Nursing notes reviewed   no history of anesthetic complications:   NPO Solid Status: > 8 hours  NPO Liquid Status: > 2 hours           Airway   Mallampati: II  TM distance: >3 FB  Neck ROM: full  No difficulty expected  Dental      Pulmonary - negative pulmonary ROS and normal exam    breath sounds clear to auscultation  Cardiovascular - normal exam  Exercise tolerance: good (4-7 METS)    Rhythm: regular  Rate: normal    (+) hypertension, hyperlipidemia      Neuro/Psych  (+) headaches  GI/Hepatic/Renal/Endo    (+) GERD, diabetes mellitus type 2, thyroid problem     Musculoskeletal (-) negative ROS    Abdominal    Substance History - negative use     OB/GYN negative ob/gyn ROS         Other - negative ROS       ROS/Med Hx Other: HX HTN, PALPITATIONS, DM, HLD. METS>4. NO CP/SOA ECHO 4/1/22 EF 55-60%. ELM               Anesthesia Plan    ASA 3     general with block and ERAS Protocol     (Patient understands anesthesia not responsible for dental damage.)  intravenous induction     Anesthetic plan, risks, benefits, and alternatives have been provided, discussed and informed consent has been obtained with: patient.  Pre-procedure education provided  Plan discussed with CRNA.    CODE STATUS:

## 2025-03-17 NOTE — ANESTHESIA PROCEDURE NOTES
Peripheral Block      Patient reassessed immediately prior to procedure    Patient location during procedure: pre-op  Reason for block: at surgeon's request and post-op pain management  Preanesthetic Checklist  Completed: patient identified, IV checked, site marked, risks and benefits discussed, surgical consent, monitors and equipment checked, pre-op evaluation and timeout performed  Prep:  Pt Position: supine  Sterile barriers:alcohol skin prep, partial drape, cap, washed/disinfected hands, mask and gloves  Prep: ChloraPrep  Patient monitoring: blood pressure monitoring, continuous pulse oximetry and EKG  Procedure    Sedation: yes  Performed under: local infiltration  Guidance:ultrasound guided and nerve stimulator    ULTRASOUND INTERPRETATION.  Using ultrasound guidance a 20 G gauge needle was placed in close proximity to the nerve, at which point, under ultrasound guidance anesthetic was injected in the area of the nerve and spread of the anesthesia was seen on ultrasound in close proximity thereto.  There were no abnormalities seen on ultrasound; a digital image was taken; and the patient tolerated the procedure with no complications. Images:still images obtained, printed/placed on chart    Laterality:right  Block Type:adductor canal block  Injection Technique:single-shot  Needle Type:echogenic  Needle Gauge:20 G (4in)  Resistance on Injection: none    Medications Used: bupivacaine (PF) (MARCAINE) 0.5 % injection - Injection   30 mL - 3/17/2025 10:56:00 AM      Post Assessment  Injection Assessment: negative aspiration for heme, no paresthesia on injection and incremental injection  Patient Tolerance:comfortable throughout block  Complications:no  Additional Notes  The block or continuous infusion is requested by the referring physician for management of postoperative pain, or pain related to a procedure. Ultrasound guidance (deemed medically necessary). Painless injection, pt was awake and conversant during  the procedure without complications. Needle and surrounding structures visualized throughout procedure. No adverse reactions or complications seen during this period. Post-procedure image showed no signs of complication, and anatomy was consistent with an uncomplicated nerve blockade.  Performed by: Guy Lowry MD

## 2025-03-17 NOTE — ANESTHESIA POSTPROCEDURE EVALUATION
Patient: Rosaura Nelson    Procedure Summary       Date: 03/17/25 Room / Location: [facility] OR 03 / [facility] MAIN OR; [facility] ANESTHESIA    Anesthesia Start: 1140 Anesthesia Stop: 1318    Procedures:       ANESTHESIA PERIPHERAL BLOCK      RIGHT TOTAL KNEE ARTHROPLASTY (Right: Knee) Diagnosis:       Osteoarthritis of right knee, unspecified osteoarthritis type      (Osteoarthritis of right knee, unspecified osteoarthritis type [M17.11])    Scheduled Providers: Hanh Nazario MD Provider: Guy Lowry MD    Anesthesia Type: general with block, ERAS Protocol ASA Status: 3            Anesthesia Type: general with block, ERAS Protocol    Vitals  Vitals Value Taken Time   /83 03/17/25 13:58   Temp 36.2 °C (97.1 °F) 03/17/25 13:50   Pulse 71 03/17/25 14:03   Resp 12 03/17/25 13:55   SpO2 99 % 03/17/25 14:03   Vitals shown include unfiled device data.        Post Anesthesia Care and Evaluation    Patient location during evaluation: bedside  Patient participation: complete - patient participated  Level of consciousness: awake and alert  Pain management: adequate    Airway patency: patent  Anesthetic complications: No anesthetic complications  PONV Status: none  Cardiovascular status: acceptable  Respiratory status: acceptable  Hydration status: acceptable    Comments: An Anesthesiologist personally participated in the most demanding procedures (including induction and emergence if applicable) in the anesthesia plan, monitored the course of anesthesia administration at frequent intervals and remained physically present and available for immediate diagnosis and treatment of emergencies.

## 2025-03-17 NOTE — CONSULTS
[facility]   Hospitalist Consult Note  Date: 3/17/2025   Patient Name: Rosaura Nelson  : 1955  MRN: [id number]  Primary Care Physician:  Meena Cordoba APRN  Referring Physician: Hanh Nazario MD  Date of admission: 3/17/2025    Subjective   Subjective     Reason for Consult/ Chief Complaint: Right knee osteoarthritis    HPI:  Rosaura Nelson is a 69 y.o. female with history of hypertension, hypothyroidism, irritable bowel syndrome and hyperlipidemia who was complaining of right knee pain which failed outpatient therapy.  Patient was admitted today and underwent Right total knee arthroplasty for right knee osteoarthritis.  She tolerated the procedure well.  Medicine was consulted for management of her medical conditions.  Patient laying in bed, denied pain during my evaluation.  Complaining of nausea likely due to anesthesia.  No vomiting.  No other active complaints.    Review of Systems   All systems were reviewed and negative except for: right knee Osteoarthritis    Personal History     Past Medical History:  Past Medical History:   Diagnosis Date    Arthritis     Bowel disease     Cervicalgia 10/04/2013    Cholelithiasis 2018    Gallbladder removed     Diabetes     Disease of thyroid gland     Diverticulitis     Diverticulitis of colon     Essential hypertension     GERD (gastroesophageal reflux disease)     Headache     High cholesterol     Hyperlipemia     Irritable bowel syndrome     Lactose intolerance     Limb swelling     Neck pain     Palpitations     PT DENIES RECENT PALPITATIONS    PONV (postoperative nausea and vomiting)     Seasonal allergies     Sprain and strain          Past Surgical History:  Past Surgical History:   Procedure Laterality Date    APPENDECTOMY      BUNIONECTOMY      CHOLECYSTECTOMY      COLONOSCOPY  2019    ENDOSCOPY  2019    HYSTERECTOMY      KNEE SURGERY Left     OTHER SURGICAL HISTORY Left 2015    KNEE REPLACED AND REVISED    SHOULDER SURGERY  Bilateral     TUBAL ABDOMINAL LIGATION      UPPER GASTROINTESTINAL ENDOSCOPY          Family History:   Family History   Problem Relation Age of Onset    Diabetes Mother     Arthritis Mother     Osteoporosis Mother     Diabetes Father     Arthritis Father     Arthritis Sister     Osteoporosis Sister     Diabetes Brother     Diabetes Daughter     Diabetes Other     Colon cancer Neg Hx     Malig Hyperthermia Neg Hx        Social History:   Social History     Socioeconomic History    Marital status:    Tobacco Use    Smoking status: Never     Passive exposure: Past    Smokeless tobacco: Never   Vaping Use    Vaping status: Never Used   Substance and Sexual Activity    Alcohol use: No    Drug use: Never    Sexual activity: Defer     Partners: Male       Home Medications:  Vibegron, baclofen, cholecalciferol, colestipol, dicyclomine, estradiol, levothyroxine sodium, losartan, lovastatin, meloxicam, metFORMIN ER, metoprolol tartrate, and omeprazole    Allergies:  Allergies   Allergen Reactions    Latex Itching, Rash and Unknown - High Severity       Review of Systems   All systems were reviewed and negative except for: Right knee pain    Objective    Objective     Vitals:   Temp:  [97 °F (36.1 °C)-97.5 °F (36.4 °C)] 97.1 °F (36.2 °C)  Heart Rate:  [59-79] 68  Resp:  [10-16] 16  BP: (118-155)/(64-86) 135/79  Flow (L/min) (Oxygen Therapy):  [2-6] 6    Physical Exam:   Constitutional: Awake, alert, no acute distress   Respiratory: Clear to auscultation bilaterally, nonlabored respirations    Cardiovascular: RRR, no murmurs, rubs, or gallops, palpable pedal pulses bilaterally   Gastrointestinal: Positive bowel sounds, soft, nontender, nondistended   Neurologic: Oriented x 3, speech clear       Result Review    Result Review:  I have personally reviewed the results from the time of this admission to 3/17/2025 16:03 EDT and agree with these findings:  []  Laboratory  []  Microbiology  []  Radiology  []  EKG/Telemetry    []  Cardiology/Vascular   []  Pathology  []  Old records  []  Other:    Assessment & Plan   Assessment / Plan     Assessment/Plan:  Right knee osteoarthritis s/p right total knee arthroplasty, POD #0  History of hypertension  Hyperlipidemia  Hypothyroidism  Irritable bowel syndrome with diarrhea    -Patient is being managed in [facility] unit.  -Underwent right total knee arthroplasty today for right knee osteoarthritis.  -tolerated the procedure well.  -On as needed pain medication as per orthopedic surgeon.  -On DVT prophylaxis as per orthopedic surgeon.  -On as needed Zofran for nausea.  -On metformin at home, last HbA1c 5.10.  Continue insulin sliding scale while in the hospital.  -Continue home medications including atorvastatin, levothyroxine and Lopressor.  -Continue rest of current medications and management.  -Labs in AM.  -PT evaluated the patient and recommended home with outpatient therapy.  OT evaluation pending.  -Labs in AM.  -Rest as per orthopedic surgeon.  -Will continue to follow the patient with the hospitalization.    VTE Prophylaxis:  Pharmacologic & mechanical VTE prophylaxis orders are present.           Electronically signed by Neha Finn MD, 03/17/25, 2:38 PM EDT.

## 2025-03-17 NOTE — THERAPY EVALUATION
Acute Care - Physical Therapy Initial Evaluation   [place]     Patient Name: Rosaura Nelson  : 1955  MRN: [id number]  Today's Date: 3/17/2025      Visit Dx:     ICD-10-CM ICD-9-CM   1. Difficulty in walking  R26.2 719.7   2. Osteoarthritis of right knee, unspecified osteoarthritis type  M17.11 715.96     Patient Active Problem List   Diagnosis    Right knee pain    Status post total knee replacement, left    Primary osteoarthritis of right knee    Arthritis    Cervical spondylosis    DDD (degenerative disc disease), cervical    Cervical stenosis of spinal canal    Diabetes    Essential hypertension    High cholesterol    Irritable bowel syndrome    Seasonal allergic rhinitis    Neck pain    Headache    Limb swelling    Osteoarthritis of right knee    OA (osteoarthritis) of knee     Past Medical History:   Diagnosis Date    Arthritis     Bowel disease     Cervicalgia 10/04/2013    Cholelithiasis 2018    Gallbladder removed     Diabetes     Disease of thyroid gland     Diverticulitis     Diverticulitis of colon     Essential hypertension     GERD (gastroesophageal reflux disease)     Headache     High cholesterol     Hyperlipemia     Irritable bowel syndrome     Lactose intolerance     Limb swelling     Neck pain     Palpitations     PT DENIES RECENT PALPITATIONS    PONV (postoperative nausea and vomiting)     Seasonal allergies     Sprain and strain      Past Surgical History:   Procedure Laterality Date    APPENDECTOMY      BUNIONECTOMY      CHOLECYSTECTOMY      COLONOSCOPY  2019    ENDOSCOPY  2019    HYSTERECTOMY      KNEE SURGERY Left     OTHER SURGICAL HISTORY Left 2015    KNEE REPLACED AND REVISED    SHOULDER SURGERY Bilateral     TUBAL ABDOMINAL LIGATION      UPPER GASTROINTESTINAL ENDOSCOPY       PT Assessment (Last 12 Hours)       PT Evaluation and Treatment       Row Name 25 1400          Physical Therapy Time and Intention    Subjective Information no complaints (P)   -TM     Document  Type evaluation (P)   -     Mode of Treatment individual therapy (P)   -     Patient Effort good (P)   -       Row Name 03/17/25 1400          General Information    Prior Level of Function independent:;all household mobility (P)   -     Existing Precautions/Restrictions fall (P)   -       Row Name 03/17/25 1400          Living Environment    Current Living Arrangements home (P)   -TM     People in Home child(hayley), adult;grandchild(hayley) (P)   -     Primary Care Provided by self (P)   -       Row Name 03/17/25 1400          Range of Motion (ROM)    Range of Motion ROM is WNL;bilateral lower extremities (P)   right knee 0-95 degrees  -[facility] Name 03/17/25 1400          Strength (Manual Muscle Testing)    Strength (Manual Muscle Testing) strength is WNL;bilateral lower extremities (P)   -[facility] Name 03/17/25 1400          Mobility    Extremity Weight-bearing Status right lower extremity (P)   -     Right Lower Extremity (Weight-bearing Status) weight-bearing as tolerated (WBAT) (P)   -       Row Name 03/17/25 1400          Bed Mobility    Bed Mobility bed mobility (all) activities (P)   -     All Activities, [place] (Bed Mobility) independent (P)   -       Row Name 03/17/25 1400          Transfers    Transfers bed-chair transfer (P)   -       Row Name 03/17/25 1400          Bed-Chair Transfer    Bed-Chair [place] (Transfers) contact guard (P)   -     Assistive Device (Bed-Chair Transfers) walker, front-wheeled (P)   -       Row Name 03/17/25 1400          Gait/Stairs (Locomotion)    Gait/Stairs Locomotion gait/ambulation independence;gait/ambulation assistive device (P)   -     [place] Level (Gait) contact guard (P)   -TM     Assistive Device (Gait) walker, front-wheeled (P)   -     Patient was able to Ambulate yes (P)   -TM     Distance in Feet (Gait) 15 (P)   -       Row Name 03/17/25 1400          Balance    Balance Assessment standing dynamic balance (P)    -TM     Dynamic Standing Balance contact guard (P)   -TM     Position/Device Used, Standing Balance walker, front-wheeled (P)   -TM       Row Name             Wound Right anterior knee Surgical Closed Surgical Incision    Wound - Properties Group Side: Right  -EH Orientation: anterior  -EH Location: knee  -EH Primary Wound Type: Surgical  -EH Secondary Wound Type - Surgical: Closed Surgi  -EH    Retired Wound - Properties Group Side: Right  -EH Orientation: anterior  -EH Location: knee  -EH    Retired Wound - Properties Group Side: Right  -EH Orientation: anterior  -EH Location: knee  -EH    Retired Wound - Properties Group Side: Right  -EH Location: knee  -EH      Row Name 03/17/25 1400          Plan of Care Review    Plan of Care Reviewed With patient (P)   -TM     Outcome Evaluation Pt presents with balance deficits and difficulty with ambulation. Pt requires skilled therapy services (P)   -TM       Row Name 03/17/25 1400          Therapy Assessment/Plan (PT)    Patient/Family Therapy Goals Statement (PT) walk without pain (P)   -TM     Rehab Potential (PT) good (P)   -TM     Criteria for Skilled Interventions Met (PT) skilled treatment is necessary (P)   -TM     Therapy Frequency (PT) 2 times/day (P)   -TM     Predicted Duration of Therapy Intervention (PT) 10 days (P)   -TM     Problem List (PT) problems related to;balance;mobility;strength;range of motion (ROM) (P)   -TM     Activity Limitations Related to Problem List (PT) unable to ambulate safely (P)   -TM       Row Name 03/17/25 1400          PT Evaluation Complexity    History, PT Evaluation Complexity no personal factors and/or comorbidities (P)   -TM     Examination of Body Systems (PT Eval Complexity) total of 4 or more elements (P)   -TM     Clinical Presentation (PT Evaluation Complexity) stable (P)   -TM     Clinical Decision Making (PT Evaluation Complexity) low complexity (P)   -TM     Overall Complexity (PT Evaluation Complexity) low complexity  (P)   -       Row Name 03/17/25 1400          Therapy Plan Review/Discharge Plan (PT)    Therapy Plan Review (PT) evaluation/treatment results reviewed;spouse/significant other;patient (P)   -       Row Name 03/17/25 1400          Physical Therapy Goals    Transfer Goal Selection (PT) transfer, PT goal 1 (P)   -TM     Gait Training Goal Selection (PT) gait training, PT goal 1 (P)   -TM     ROM Goal Selection (PT) ROM, PT goal 1 (P)   -TM     Balance Goal Selection (PT) balance, PT goal 1 (P)   -       Row Name 03/17/25 1400          Transfer Goal 1 (PT)    Activity/Assistive Device (Transfer Goal 1, PT) bed-to-chair/chair-to-bed (P)   -TM     [place] Level/Cues Needed (Transfer Goal 1, PT) independent (P)   -TM     Time Frame (Transfer Goal 1, PT) 10 days (P)   -       Row Name 03/17/25 1400          Gait Training Goal 1 (PT)    Activity/Assistive Device (Gait Training Goal 1, PT) gait (walking locomotion);assistive device use (P)   -TM     [place] Level (Gait Training Goal 1, PT) independent (P)   -TM     Distance (Gait Training Goal 1, PT) 300' (P)   -TM     Time Frame (Gait Training Goal 1, PT) 10 days (P)   -       Row Name 03/17/25 1400          ROM Goal 1 (PT)    ROM Goal 1 (PT) 0-105 degrees right knee (P)   -TM     Time Frame (ROM Goal 1, PT) 10 days (P)   -       Row Name 03/17/25 1400          Balance Goal 1 (PT)    Activity/Assistive Device (Balance Goal) standing dynamic balance (P)   -TM     [place] Level/Cues Needed (Balance Goal 1, PT) independent (P)   -TM     Time Frame (Balance Goal 1, PT) 2 weeks (P)   -TM               User Key  (r) = Recorded By, (t) = Taken By, (c) = Cosigned By      Initials Name Provider Type     Alycia Peters, RN Registered Nurse    Isaias Delgado, PT Student PT Student                    Physical Therapy Education       Title: PT OT SLP Therapies (Done)       Topic: Physical Therapy (Done)       Point: Mobility training (Done)        Learning Progress Summary            Patient Acceptance, E,TB, VU by TM at 3/17/2025 1423                      Point: Home exercise program (Done)       Learning Progress Summary            Patient Acceptance, E,TB, VU by TM at 3/17/2025 1423                      Point: Body mechanics (Done)       Learning Progress Summary            Patient Acceptance, E,TB, VU by TM at 3/17/2025 1423                      Point: Precautions (Done)       Learning Progress Summary            Patient Acceptance, E,TB, VU by TM at 3/17/2025 1423                                      User Key       Initials Effective Dates Name Provider Type Discipline    TM 02/04/25 -  Isaias Robertson, PT Student PT Student PT                  PT Recommendation and Plan  Anticipated Discharge Disposition (PT): (P) home with outpatient therapy services  Planned Therapy Interventions (PT): (P) balance training, gait training, home exercise program, ROM (range of motion), strengthening, transfer training  Therapy Frequency (PT): (P) 2 times/day  Plan of Care Reviewed With: (P) patient  Outcome Evaluation: (P) Pt presents with balance deficits and difficulty with ambulation. Pt requires skilled therapy services   Outcome Measures       Row Name 03/17/25 1400             How much help from another person do you currently need...    Turning from your back to your side while in flat bed without using bedrails? 4 (P)   -TM      Moving from lying on back to sitting on the side of a flat bed without bedrails? 4 (P)   -TM      Moving to and from a bed to a chair (including a wheelchair)? 3 (P)   -TM      Standing up from a chair using your arms (e.g., wheelchair, bedside chair)? 3 (P)   -TM      Climbing 3-5 steps with a railing? 3 (P)   -TM      To walk in hospital room? 3 (P)   -TM      AM-PAC 6 Clicks Score (PT) 20 (P)   -TM         Functional Assessment    Outcome Measure Options AM-PAC 6 Clicks Basic Mobility (PT) (P)   -TM                User Key  (r) =  Recorded By, (t) = Taken By, (c) = Cosigned By      Initials Name Provider Type    TM Isaias Robertson, PT Student PT Student                     Time Calculation:    PT Charges       Row Name 03/17/25 1420             Time Calculation    PT Received On 03/17/25 (P)   -TM      PT Goal Re-Cert Due Date 03/26/25 (P)   -TM         Untimed Charges    PT Eval/Re-eval Minutes 25 (P)   -TM         Total Minutes    Untimed Charges Total Minutes 25 (P)   -TM       Total Minutes 25 (P)   -TM                User Key  (r) = Recorded By, (t) = Taken By, (c) = Cosigned By      Initials Name Provider Type    TM Isaias Robertson, PT Student PT Student                  Therapy Charges for Today       Code Description Service Date Service Provider Modifiers Qty    [id number] HC PT EVAL LOW COMPLEXITY 2 3/17/2025 Isaias Robertson PT Student GP 1            PT G-Codes  Outcome Measure Options: (P) AM-PAC 6 Clicks Basic Mobility (PT)  AM-PAC 6 Clicks Score (PT): (P) 20    Isaias Robertson PT Student  3/17/2025

## 2025-03-17 NOTE — PLAN OF CARE
Goal Outcome Evaluation:  Plan of Care Reviewed With: patient           Outcome Evaluation: Pt alert and oriented. VSS Pt has bouts of nausea and vomiting. Daughter reports not uncommon. Pt medicated for pain once during shift. Intervention effective per pt. Plans are for pt to dc home tomorrow and use [facility] Outpatient care. DME walker ordered.

## 2025-03-17 NOTE — OP NOTE
TOTAL KNEE ARTHROPLASTY  Procedure Report    Patient Name:  Rosaura Nelson  YOB: 1955    Date of Surgery:  3/17/2025     Indications:  Severe OA, failed conservative treatment    Pre-op Diagnosis:   Osteoarthritis of right knee, unspecified osteoarthritis type [M17.11]       Post-Op Diagnosis Codes:     * Osteoarthritis of right knee, unspecified osteoarthritis type [M17.11]      Procedure(s):  RIGHT TOTAL KNEE ARTHROPLASTY    Surgical Approach: Knee Medial Parapatellar        Staff:  Surgeon(s):  Hanh Nazario MD    Assistant: Lenora Paniagua RN; Adiel Dunn    Anesthesia: General with Block    Estimated Blood Loss: 30 mL    Implants:    Implant Name Type Inv. Item Serial No.  Lot No. LRB No. Used Action   CMT BONE PALACOS R HI/VISC 1X40 - [id number] Implant CMT BONE PALACOS R HI/VISC 1X40  [facility] [id number] Right 2 Implanted   COMP FEM/KN VANGUARD INTLK CR 67.5MM NS RT - [id number] Implant COMP FEM/KN VANGUARD INTLK CR 67.5MM NS RT  [employer] [id number] Right 1 Implanted   SCRW COMPR NOHD FUL/THRD TI MICRO 2.8X28MM - [id number] Implant SCRW COMPR NOHD FUL/THRD TI MICRO 2.8X28MM  [employer] [id number] Right 1 Implanted   PAT ARCOM W/WIRE 3PEG 31MM SM - [id number] Implant PAT ARCOM W/WIRE 3PEG 31MM SM  [employer] [id number] Right 1 Implanted   BEAR TIB/KN VANGUARD ANT/STBL VIT/E [id number] - [id number] Implant BEAR TIB/KN VANGUARD ANT/STBL VIT/E [id number]  [employer] [id number] Right 1 Implanted       Specimen:          None        Findings: advance OA    Complications: None    Description of Procedure: The patient was brought to the operating room and underwent general anesthesia, had a preoperative antibiotic, and was then prepped and draped in sterile fashion. An Esmarch was used to exsanguinate the limb. The tourniquet was then inflated. A standard medial parapatellar arthrotomy was performed. An intramedullary guide was placed in the femur. A distal femoral cut was made and  measured. The 4-in-1 block was placed. Excellent cuts were achieved. Bone was removed, followed by removal of the ACL and remaining menisci. The intramedullary guide was placed in the tibia. The tibia was then cut and measured. This was then broached. The patella was then osteotomized, removing approximately 8-10 mm of bone. It measured. There was excellent range of motion, tracking and stability of the trials. The trials were removed. Bony cut surfaces were thoroughly irrigated. The knee was then injected. The cement was vacuum mixed, placed on the undersurface of the components and then packed into place. Excess cement was removed. Once this had hardened, trial polys were tried and the appropriate sized anterior insert was then chosen. This was then locked, thoroughly irrigated. Bovie electrocautery was used for hemostasis. The tourniquet was deflated. This was again thoroughly irrigated and closed using #1 Vicryl, 2-0 Vicryl and staples. A sterile dressing was applied. The patient was then extubated and taken to the recovery room in stable condition. There were no complications.    Assistant: Lenora Paniagua RN; Adiel Dunn  was responsible for performing the following activities: Retraction, Suction, Irrigation, Closing, and Placing Dressing and their skilled assistance was necessary for the success of this case.    Hanh Nazario MD     Date: 3/17/2025  Time: 13:15 EDT

## 2025-03-18 VITALS
DIASTOLIC BLOOD PRESSURE: 72 MMHG | WEIGHT: 147.05 LBS | TEMPERATURE: 97.7 F | HEIGHT: 68 IN | HEART RATE: 79 BPM | SYSTOLIC BLOOD PRESSURE: 105 MMHG | BODY MASS INDEX: 22.29 KG/M2 | RESPIRATION RATE: 15 BRPM | OXYGEN SATURATION: 100 %

## 2025-03-18 LAB
ANION GAP SERPL CALCULATED.3IONS-SCNC: 8.2 MMOL/L (ref 5–15)
BASOPHILS # BLD AUTO: 0.03 10*3/MM3 (ref 0–0.2)
BASOPHILS NFR BLD AUTO: 0.3 % (ref 0–1.5)
BUN SERPL-MCNC: 15 MG/DL (ref 8–23)
BUN/CREAT SERPL: 20.3 (ref 7–25)
CALCIUM SPEC-SCNC: 8.3 MG/DL (ref 8.6–10.5)
CHLORIDE SERPL-SCNC: 102 MMOL/L (ref 98–107)
CO2 SERPL-SCNC: 25.8 MMOL/L (ref 22–29)
CREAT SERPL-MCNC: 0.74 MG/DL (ref 0.57–1)
DEPRECATED RDW RBC AUTO: 44.8 FL (ref 37–54)
EGFRCR SERPLBLD CKD-EPI 2021: 87.7 ML/MIN/1.73
EOSINOPHIL # BLD AUTO: 0 10*3/MM3 (ref 0–0.4)
EOSINOPHIL NFR BLD AUTO: 0 % (ref 0.3–6.2)
ERYTHROCYTE [DISTWIDTH] IN BLOOD BY AUTOMATED COUNT: 12.5 % (ref 12.3–15.4)
FERRITIN SERPL-MCNC: 241.8 NG/ML (ref 13–150)
GLUCOSE BLDC GLUCOMTR-MCNC: 91 MG/DL (ref 70–99)
GLUCOSE BLDC GLUCOMTR-MCNC: 93 MG/DL (ref 70–99)
GLUCOSE SERPL-MCNC: 121 MG/DL (ref 65–99)
HCT VFR BLD AUTO: 27.2 % (ref 34–46.6)
HCT VFR BLD AUTO: 28.5 % (ref 34–46.6)
HGB BLD-MCNC: 9.2 G/DL (ref 12–15.9)
HGB BLD-MCNC: 9.8 G/DL (ref 12–15.9)
IMM GRANULOCYTES # BLD AUTO: 0.06 10*3/MM3 (ref 0–0.05)
IMM GRANULOCYTES NFR BLD AUTO: 0.6 % (ref 0–0.5)
IRON 24H UR-MRATE: 27 MCG/DL (ref 37–145)
IRON SATN MFR SERPL: 9 % (ref 20–50)
LYMPHOCYTES # BLD AUTO: 1.24 10*3/MM3 (ref 0.7–3.1)
LYMPHOCYTES NFR BLD AUTO: 11.9 % (ref 19.6–45.3)
MAGNESIUM SERPL-MCNC: 1.8 MG/DL (ref 1.6–2.4)
MCH RBC QN AUTO: 33.8 PG (ref 26.6–33)
MCHC RBC AUTO-ENTMCNC: 34.4 G/DL (ref 31.5–35.7)
MCV RBC AUTO: 98.3 FL (ref 79–97)
MONOCYTES # BLD AUTO: 0.91 10*3/MM3 (ref 0.1–0.9)
MONOCYTES NFR BLD AUTO: 8.7 % (ref 5–12)
NEUTROPHILS NFR BLD AUTO: 78.5 % (ref 42.7–76)
NEUTROPHILS NFR BLD AUTO: 8.2 10*3/MM3 (ref 1.7–7)
NRBC BLD AUTO-RTO: 0 /100 WBC (ref 0–0.2)
PHOSPHATE SERPL-MCNC: 3.2 MG/DL (ref 2.5–4.5)
PLATELET # BLD AUTO: 227 10*3/MM3 (ref 140–450)
PMV BLD AUTO: 10.8 FL (ref 6–12)
POTASSIUM SERPL-SCNC: 5.2 MMOL/L (ref 3.5–5.2)
RBC # BLD AUTO: 2.9 10*6/MM3 (ref 3.77–5.28)
SODIUM SERPL-SCNC: 136 MMOL/L (ref 136–145)
TIBC SERPL-MCNC: 295 MCG/DL (ref 298–536)
TRANSFERRIN SERPL-MCNC: 198 MG/DL (ref 200–360)
WBC NRBC COR # BLD AUTO: 10.44 10*3/MM3 (ref 3.4–10.8)

## 2025-03-18 PROCEDURE — 82948 REAGENT STRIP/BLOOD GLUCOSE: CPT

## 2025-03-18 PROCEDURE — 97116 GAIT TRAINING THERAPY: CPT

## 2025-03-18 PROCEDURE — 25010000002 ENOXAPARIN PER 10 MG: Performed by: ORTHOPAEDIC SURGERY

## 2025-03-18 PROCEDURE — 97150 GROUP THERAPEUTIC PROCEDURES: CPT

## 2025-03-18 PROCEDURE — 25010000002 CALCIUM GLUCONATE 2-0.675 GM/100ML-% SOLUTION: Performed by: STUDENT IN AN ORGANIZED HEALTH CARE EDUCATION/TRAINING PROGRAM

## 2025-03-18 PROCEDURE — 83540 ASSAY OF IRON: CPT | Performed by: STUDENT IN AN ORGANIZED HEALTH CARE EDUCATION/TRAINING PROGRAM

## 2025-03-18 PROCEDURE — 84100 ASSAY OF PHOSPHORUS: CPT | Performed by: STUDENT IN AN ORGANIZED HEALTH CARE EDUCATION/TRAINING PROGRAM

## 2025-03-18 PROCEDURE — 85025 COMPLETE CBC W/AUTO DIFF WBC: CPT | Performed by: STUDENT IN AN ORGANIZED HEALTH CARE EDUCATION/TRAINING PROGRAM

## 2025-03-18 PROCEDURE — 83735 ASSAY OF MAGNESIUM: CPT | Performed by: STUDENT IN AN ORGANIZED HEALTH CARE EDUCATION/TRAINING PROGRAM

## 2025-03-18 PROCEDURE — 80048 BASIC METABOLIC PNL TOTAL CA: CPT | Performed by: STUDENT IN AN ORGANIZED HEALTH CARE EDUCATION/TRAINING PROGRAM

## 2025-03-18 PROCEDURE — 25810000003 SODIUM CHLORIDE 0.9 % SOLUTION: Performed by: STUDENT IN AN ORGANIZED HEALTH CARE EDUCATION/TRAINING PROGRAM

## 2025-03-18 PROCEDURE — 97535 SELF CARE MNGMENT TRAINING: CPT

## 2025-03-18 PROCEDURE — 97165 OT EVAL LOW COMPLEX 30 MIN: CPT

## 2025-03-18 PROCEDURE — 85014 HEMATOCRIT: CPT | Performed by: STUDENT IN AN ORGANIZED HEALTH CARE EDUCATION/TRAINING PROGRAM

## 2025-03-18 PROCEDURE — 85018 HEMOGLOBIN: CPT | Performed by: STUDENT IN AN ORGANIZED HEALTH CARE EDUCATION/TRAINING PROGRAM

## 2025-03-18 PROCEDURE — 25010000002 CEFAZOLIN PER 500 MG: Performed by: ORTHOPAEDIC SURGERY

## 2025-03-18 PROCEDURE — 82728 ASSAY OF FERRITIN: CPT | Performed by: STUDENT IN AN ORGANIZED HEALTH CARE EDUCATION/TRAINING PROGRAM

## 2025-03-18 PROCEDURE — 97530 THERAPEUTIC ACTIVITIES: CPT

## 2025-03-18 PROCEDURE — 25010000002 KETOROLAC TROMETHAMINE PER 15 MG: Performed by: ORTHOPAEDIC SURGERY

## 2025-03-18 PROCEDURE — 99213 OFFICE O/P EST LOW 20 MIN: CPT | Performed by: STUDENT IN AN ORGANIZED HEALTH CARE EDUCATION/TRAINING PROGRAM

## 2025-03-18 PROCEDURE — 82948 REAGENT STRIP/BLOOD GLUCOSE: CPT | Performed by: STUDENT IN AN ORGANIZED HEALTH CARE EDUCATION/TRAINING PROGRAM

## 2025-03-18 PROCEDURE — 84466 ASSAY OF TRANSFERRIN: CPT | Performed by: STUDENT IN AN ORGANIZED HEALTH CARE EDUCATION/TRAINING PROGRAM

## 2025-03-18 RX ORDER — FERROUS SULFATE 325(65) MG
325 TABLET ORAL
Qty: 30 TABLET | Refills: 0 | Status: SHIPPED | OUTPATIENT
Start: 2025-03-19 | End: 2025-04-18

## 2025-03-18 RX ORDER — CALCIUM GLUCONATE 20 MG/ML
2000 INJECTION, SOLUTION INTRAVENOUS ONCE
Status: COMPLETED | OUTPATIENT
Start: 2025-03-18 | End: 2025-03-18

## 2025-03-18 RX ORDER — HYDROCODONE BITARTRATE AND ACETAMINOPHEN 7.5; 325 MG/1; MG/1
1-2 TABLET ORAL EVERY 4 HOURS PRN
Qty: 40 TABLET | Refills: 0 | Status: SHIPPED | OUTPATIENT
Start: 2025-03-18

## 2025-03-18 RX ADMIN — OXYBUTYNIN CHLORIDE 10 MG: 5 TABLET, EXTENDED RELEASE ORAL at 08:44

## 2025-03-18 RX ADMIN — ESTRADIOL 0.5 MG: 0.5 TABLET ORAL at 08:51

## 2025-03-18 RX ADMIN — SODIUM CHLORIDE 2000 MG: 9 INJECTION, SOLUTION INTRAVENOUS at 05:00

## 2025-03-18 RX ADMIN — LEVOTHYROXINE SODIUM 50 MCG: 0.05 TABLET ORAL at 05:00

## 2025-03-18 RX ADMIN — SENNOSIDES AND DOCUSATE SODIUM 2 TABLET: 50; 8.6 TABLET ORAL at 08:44

## 2025-03-18 RX ADMIN — CHOLESTYRAMINE 4 G: 4 POWDER, FOR SUSPENSION ORAL at 08:45

## 2025-03-18 RX ADMIN — HYDROCODONE BITARTRATE AND ACETAMINOPHEN 1 TABLET: 7.5; 325 TABLET ORAL at 13:28

## 2025-03-18 RX ADMIN — DICYCLOMINE HYDROCHLORIDE 20 MG: 20 TABLET ORAL at 13:23

## 2025-03-18 RX ADMIN — SODIUM CHLORIDE 500 ML: 9 INJECTION, SOLUTION INTRAVENOUS at 08:52

## 2025-03-18 RX ADMIN — DICYCLOMINE HYDROCHLORIDE 20 MG: 20 TABLET ORAL at 05:00

## 2025-03-18 RX ADMIN — Medication 2000 UNITS: at 08:44

## 2025-03-18 RX ADMIN — KETOROLAC TROMETHAMINE 15 MG: 15 INJECTION, SOLUTION INTRAMUSCULAR; INTRAVENOUS at 13:23

## 2025-03-18 RX ADMIN — KETOROLAC TROMETHAMINE 15 MG: 15 INJECTION, SOLUTION INTRAMUSCULAR; INTRAVENOUS at 05:00

## 2025-03-18 RX ADMIN — FERROUS SULFATE TAB 325 MG (65 MG ELEMENTAL FE) 325 MG: 325 (65 FE) TAB at 08:44

## 2025-03-18 RX ADMIN — CALCIUM GLUCONATE 2000 MG: 20 INJECTION, SOLUTION INTRAVENOUS at 08:51

## 2025-03-18 RX ADMIN — ENOXAPARIN SODIUM 40 MG: 100 INJECTION SUBCUTANEOUS at 08:44

## 2025-03-18 RX ADMIN — HYDROCODONE BITARTRATE AND ACETAMINOPHEN 1 TABLET: 7.5; 325 TABLET ORAL at 08:44

## 2025-03-18 RX ADMIN — FAMOTIDINE 40 MG: 20 TABLET, FILM COATED ORAL at 08:44

## 2025-03-18 NOTE — THERAPY TREATMENT NOTE
Acute Care - Physical Therapy Treatment Note   [place]     Patient Name: Rosaura Nelson  : 1955  MRN: [id number]  Today's Date: 3/18/2025      Visit Dx:     ICD-10-CM ICD-9-CM   1. Difficulty in walking  R26.2 719.7   2. Osteoarthritis of right knee, unspecified osteoarthritis type  M17.11 715.96     Patient Active Problem List   Diagnosis    Right knee pain    Status post total knee replacement, left    Primary osteoarthritis of right knee    Arthritis    Cervical spondylosis    DDD (degenerative disc disease), cervical    Cervical stenosis of spinal canal    Diabetes    Essential hypertension    High cholesterol    Irritable bowel syndrome    Seasonal allergic rhinitis    Neck pain    Headache    Limb swelling    Osteoarthritis of right knee    OA (osteoarthritis) of knee     Past Medical History:   Diagnosis Date    Arthritis     Bowel disease     Cervicalgia 10/04/2013    Cholelithiasis 2018    Gallbladder removed     Diabetes     Disease of thyroid gland     Diverticulitis     Diverticulitis of colon     Essential hypertension     GERD (gastroesophageal reflux disease)     Headache     High cholesterol     Hyperlipemia     Irritable bowel syndrome     Lactose intolerance     Limb swelling     Neck pain     Palpitations     PT DENIES RECENT PALPITATIONS    PONV (postoperative nausea and vomiting)     Seasonal allergies     Sprain and strain      Past Surgical History:   Procedure Laterality Date    APPENDECTOMY      BUNIONECTOMY      CHOLECYSTECTOMY      COLONOSCOPY  2019    ENDOSCOPY  2019    HYSTERECTOMY      KNEE SURGERY Left     OTHER SURGICAL HISTORY Left 2015    KNEE REPLACED AND REVISED    SHOULDER SURGERY Bilateral     TOTAL KNEE ARTHROPLASTY Right 3/17/2025    Procedure: RIGHT TOTAL KNEE ARTHROPLASTY;  Surgeon: Hanh Nazario MD;  Location: [facility] MAIN OR;  Service: Orthopedics;  Laterality: Right;    TUBAL ABDOMINAL LIGATION      UPPER GASTROINTESTINAL ENDOSCOPY       PT Assessment  Chief Complaint   Patient presents with     Consult     scar revision        Vitals:    03/07/18 1013   BP: 145/90   Pulse: 74   SpO2: 100%   Weight: 113.9 kg (251 lb)   Height: 1.829 m (6')       Body mass index is 34.04 kg/(m^2).    Dwight FLORES                         (Last 12 Hours)       PT Evaluation and Treatment       [facility] Name 03/18/25 1231          Physical Therapy Time and Intention    Subjective Information complains of;pain  -RH     Document Type therapy note (daily note)  -     Mode of Treatment individual therapy;group therapy;physical therapy  -     Patient Effort good  -RH     Comment Gait training performed individually; therapeutic exercises performed in a group setting with 5 participants  -[facility] Name 03/18/25 1231          General Information    Patient Observations alert;cooperative;agree to therapy  -[facility] Name 03/18/25 1231          Pain    Pain Location knee  -     Pain Side/Orientation right  -     Additional Documentation Pain Scale: FACES Pre/Post-Treatment (Group)  -[facility] Name 03/18/25 1231          Pain Scale: FACES Pre/Post-Treatment    Pain: FACES Scale, Pretreatment 2-->hurts little bit  -     Posttreatment Pain Rating 2-->hurts little bit  -[facility] Name 03/18/25 1231          Range of Motion (ROM)    Range of Motion --  Pt R knee AAROM at 90 degrees flex and 8 degrees ext.  -[facility] Name 03/18/25 1231          Strength (Manual Muscle Testing)    Strength (Manual Muscle Testing) --  Pt R knee ext strength at 3-/5.  -[facility] Name 03/18/25 1231          Mobility    Extremity Weight-bearing Status right lower extremity  -     Right Lower Extremity (Weight-bearing Status) weight-bearing as tolerated (WBAT)  -[facility] Name 03/18/25 1231          Transfers    Transfers sit-stand transfer;stand-sit transfer  -RH       Row Name 03/18/25 1231          Sit-Stand Transfer    Sit-Stand [place] (Transfers) contact guard  -     Assistive Device (Sit-Stand Transfers) walker, front-wheeled  -[facility] Name 03/18/25 1231          Stand-Sit Transfer    Stand-Sit [place] (Transfers) contact guard  -     Assistive Device (Stand-Sit Transfers) walker, front-wheeled  -[facility] Name 03/18/25 1231           Gait/Stairs (Locomotion)    Gait/Stairs Locomotion gait/ambulation assistive device  -     [place] Level (Gait) contact guard  -     Assistive Device (Gait) walker, front-wheeled  -     Patient was able to Ambulate yes  -RH     Distance in Feet (Gait) 160  -RH     Pattern (Gait) --  Pt able to achieve and maintain reciprocal gait.  -RH     Deviations/Abnormal Patterns (Gait) antalgic;gait speed decreased;stride length decreased  -     Bilateral Gait Deviations heel strike decreased  -     Gait Assessment/Intervention Pt minimally rushing advancement of LLE to minimize RLE weight bearing.  -       Row Name 03/18/25 1231          Safety Issues/Impairments Affecting Functional Mobility    Impairments Affecting Function (Mobility) balance;pain;range of motion (ROM);strength  -       Row Name 03/18/25 1231          Balance    Balance Assessment standing dynamic balance  -     Dynamic Standing Balance contact guard  -     Position/Device Used, Standing Balance walker, front-wheeled  -RH       Row Name 03/18/25 1231          Motor Skills    Therapeutic Exercise knee;hip;ankle  -       Row Name 03/18/25 1231          Hip (Therapeutic Exercise)    Hip (Therapeutic Exercise) isometric exercises  -     Hip Isometrics (Therapeutic Exercise) right;gluteal sets;10 repetitions;2 sets  -       Row Name 03/18/25 1231          Knee (Therapeutic Exercise)    Knee (Therapeutic Exercise) isometric exercises;strengthening exercise  -     Knee Isometrics (Therapeutic Exercise) right;quad sets;10 repetitions;2 sets  -     Knee Strengthening (Therapeutic Exercise) right;heel slides;SLR (straight leg raise);SAQ (short arc quad);LAQ (long arc quad);10 repetitions;2 sets  -       Row Name 03/18/25 1231          Ankle (Therapeutic Exercise)    Ankle (Therapeutic Exercise) AROM (active range of motion)  -     Ankle AROM (Therapeutic Exercise) right;dorsiflexion;plantarflexion;10 repetitions;2 sets  -        Row Name             Wound Right anterior knee Surgical Closed Surgical Incision    Wound - Properties Group Side: Right  -EH Orientation: anterior  -EH Location: knee  -EH Primary Wound Type: Surgical  -EH Secondary Wound Type - Surgical: Closed Surgi  -EH    Retired Wound - Properties Group Side: Right  -EH Orientation: anterior  -EH Location: knee  -EH    Retired Wound - Properties Group Side: Right  -EH Orientation: anterior  -EH Location: knee  -EH    Retired Wound - Properties Group Side: Right  -EH Location: knee  -EH      Row Name 03/18/25 1231          Positioning and Restraints    Post Treatment Position chair  -RH     In Chair reclined;call light within reach;encouraged to call for assist;exit alarm on;with family/caregiver  -RH       Row Name 03/18/25 1231          Progress Summary (PT)    Progress Toward Functional Goals (PT) progress toward functional goals is good  -     Daily Progress Summary (PT) Pt is progressing well with their exercise program.  Will continue current plan of care.  -               User Key  (r) = Recorded By, (t) = Taken By, (c) = Cosigned By      Initials Name Provider Type     Jared Silverio PTA Physical Therapist Assistant     Alycia Peters RN Registered Nurse                    Physical Therapy Education       Title: PT OT SLP Therapies (Done)       Topic: Physical Therapy (Done)       Point: Mobility training (Done)       Learning Progress Summary            Patient Acceptance, D,E, DU by PG at 3/18/2025 0941    Acceptance, E,TB, VU by TM at 3/17/2025 1423                      Point: Home exercise program (Done)       Learning Progress Summary            Patient Acceptance, D,E, DU by PG at 3/18/2025 0941    Acceptance, E,TB, VU by TM at 3/17/2025 1423                      Point: Body mechanics (Done)       Learning Progress Summary            Patient Acceptance, D,E, DU by PG at 3/18/2025 0941    Acceptance, E,TB, VU by TM at 3/17/2025 1423                       Point: Precautions (Done)       Learning Progress Summary            Patient Acceptance, D,E, DU by PG at 3/18/2025 0941    Acceptance, E,TB, VU by TM at 3/17/2025 1423                                      User Key       Initials Effective Dates Name Provider Type Discipline    PG 06/16/21 -  Nader Negron, OT Occupational Therapist OT    TM 02/04/25 -  Isaias Robertson, PT Student PT Student PT                  PT Recommendation and Plan     Progress Summary (PT)  Progress Toward Functional Goals (PT): progress toward functional goals is good  Daily Progress Summary (PT): Pt is progressing well with their exercise program.  Will continue current plan of care.   Outcome Measures       Row Name 03/18/25 1200 03/17/25 1400          How much help from another person do you currently need...    Turning from your back to your side while in flat bed without using bedrails? 4  -RH 4  -JIGNESH (r) TM (t) JIGNESH (c)     Moving from lying on back to sitting on the side of a flat bed without bedrails? 4  -RH 4  -JIGNESH (r) TM (t) JIGNESH (c)     Moving to and from a bed to a chair (including a wheelchair)? 3  -RH 3  -JIGNESH (r) TM (t) JIGNESH (c)     Standing up from a chair using your arms (e.g., wheelchair, bedside chair)? 3  -RH 3  -JIGNESH (r) TM (t) JIGNESH (c)     Climbing 3-5 steps with a railing? 3  -RH 3  -JIGNESH (r) TM (t) JIGNESH (c)     To walk in hospital room? 4  -RH 3  -JIGNESH (r) TM (t) JIGNESH (c)     AM-PAC 6 Clicks Score (PT) 21  -RH 20  -JIGNESH (r) TM (t)        Functional Assessment    Outcome Measure Options -- AM-PAC 6 Clicks Basic Mobility (PT)  -JIGNESH (r) TM (t) JIGNESH (c)               User Key  (r) = Recorded By, (t) = Taken By, (c) = Cosigned By      Initials Name Provider Type    RH Jared Silverio, PTA Physical Therapist Assistant    Barry Tripathi, PT Physical Therapist    TM Isaias Robertson, PT Student PT Student                     Time Calculation:    PT Charges       Row Name 03/18/25 1230             Time Calculation    PT Received On 03/18/25  -RH          Timed Charges    72734 - Gait Training Minutes  9  -RH      55294 - PT Therapeutic Activity Minutes 4  -RH         Untimed Charges    PT Group Therapy Minutes 30  -RH         Total Minutes    Timed Charges Total Minutes 13  -RH      Untimed Charges Total Minutes 30  -RH       Total Minutes 43  -RH                User Key  (r) = Recorded By, (t) = Taken By, (c) = Cosigned By      Initials Name Provider Type     Jared Silverio PTA Physical Therapist Assistant                  Therapy Charges for Today       Code Description Service Date Service Provider Modifiers Qty    [id number] HC GAIT TRAINING EA 15 MIN 3/18/2025 Jared Silverio PTA GP 1    [id number]  PT THER PROC GROUP 3/18/2025 Jared Silverio PTA GP 1            PT G-Codes  Outcome Measure Options: AM-PAC 6 Clicks Daily Activity (OT), Optimal Instrument  AM-PAC 6 Clicks Score (PT): 21  AM-PAC 6 Clicks Score (OT): 21    Jared Silverio PTA  3/18/2025

## 2025-03-18 NOTE — PROGRESS NOTES
[facility]   Hospitalist Progress Note  Date: 3/18/2025  Patient Name: Rosaura Nelson  : 1955  MRN: [id number]  Date of admission: 3/17/2025  Room/Bed: [facility]/      Subjective   Subjective     Chief Complaint: Right knee osteoarthritis     Summary:Rosaura Nelson is a 69 y.o. female with history of hypertension, hypothyroidism, irritable bowel syndrome and hyperlipidemia who was complaining of right knee pain which failed outpatient therapy.  Patient was admitted today and underwent Right total knee arthroplasty for right knee osteoarthritis.  She tolerated the procedure well.  Medicine was consulted for management of her medical conditions.  Patient laying in bed, denied pain during my evaluation.  Complaining of nausea likely due to anesthesia.  No vomiting.  No other active complaints.       Interval Followup: No acute events overnight; laying in recliner. Stated she feels better today, pain well controlled with as needed pain medications.  Hb 9.8 this AM with repeat Hb 9.2. Started on PO Iron for TORI.     Review of Systems    All systems reviewed and negative except for what is outlined above.      Objective   Objective     Vitals:   Temp:  [97.1 °F (36.2 °C)-98.2 °F (36.8 °C)] 98.2 °F (36.8 °C)  Heart Rate:  [61-91] 72  Resp:  [10-18] 14  BP: ()/(48-86) 107/83  Flow (L/min) (Oxygen Therapy):  [6] 6    Physical Exam   General: Awake, alert, NAD  Cardiovascular: RRR, no murmurs   Pulmonary: CTA bilaterally; no wheezes; no conversational dyspnea  Gastrointestinal: S/ND/NT, +BS  Neuro: alert, awake, oriented x 3; speech clear; no tremor      Result Review    Result Review:  I have personally reviewed these results:  [x]  Laboratory      Lab 25  0841 25  0423   WBC  --  10.44   HEMOGLOBIN 9.2* 9.8*   HEMATOCRIT 27.2* 28.5*   PLATELETS  --  227   NEUTROS ABS  --  8.20*   IMMATURE GRANS (ABS)  --  0.06*   LYMPHS ABS  --  1.24   MONOS ABS  --  0.91*   EOS ABS  --  0.00   MCV  --   98.3*         Lab 03/18/25  0423   SODIUM 136   POTASSIUM 5.2   CHLORIDE 102   CO2 25.8   ANION GAP 8.2   BUN 15   CREATININE 0.74   EGFR 87.7   GLUCOSE 121*   CALCIUM 8.3*   MAGNESIUM 1.8   PHOSPHORUS 3.2                       Lab 03/18/25  0423   IRON 27*   IRON SATURATION (TSAT) 9*   TIBC 295*   TRANSFERRIN 198*   FERRITIN 241.80*         Brief Urine Lab Results       None          [x]  Microbiology   Microbiology Results (last 10 days)       ** No results found for the last 240 hours. **          [x]  Radiology  XR Knee 1 or 2 View Right  Result Date: 3/17/2025  Impression: Expected postoperative changes from total knee arthroplasty with patella resurfacing Electronically Signed: Felice Jacobsen MD  3/17/2025 2:11 PM EDT  Workstation ID: [id number]    []  EKG/Telemetry   []  Cardiology/Vascular   []  Pathology  []  Old records  []  Other:    Assessment & Plan   Assessment / Plan     Assessment:  Right knee osteoarthritis s/p right total knee arthroplasty, POD #0  Anemia  Iron deficiency anemia  History of hypertension  Hyperlipidemia  Hypothyroidism  Irritable bowel syndrome with diarrhea    Plan:  Patient is being managed in MedSurg unit.  Underwent right total knee arthroplasty on 03/17 for right knee osteoarthritis.  Tolerated the procedure well.  On as needed pain medication as per orthopedic surgeon.  On DVT prophylaxis as per orthopedic surgeon.  On metformin at home, last HbA1c 5.10.  Continue insulin sliding scale while in the hospital.  Hb 9.8 today with repeat Hb of 9.2.  Started on PO Iron for TORI.  Continue home medications including atorvastatin, levothyroxine and Lopressor.  Continue rest of current medications and management.  Discussed with patient and family; agreed on following up with PCP tomorrow to draw lab including Hb to monitor as outpatient.  Given patient is clinically stable, no active signs and symptoms of bleeding.  Okay to be discharged from medicine standpoint given patient agreed  to follow-up with PCP to draw lab to monitor hemoglobin tomorrow.  Patient is being discharged home with outpatient therapy.  Rest as per orthopedic surgeon.       Discussed with RN.    VTE Prophylaxis:  Pharmacologic & mechanical VTE prophylaxis orders are present.           Electronically signed by Neha Finn MD, 03/18/25, 8:07 AM EDT.

## 2025-03-18 NOTE — PROGRESS NOTES
Orthopedic Total Knee Progress Note    Assessment/Plan outpatient PT at [facility], follow-up 2 weeks, DVT prophylaxis, PT/OT    Status post-right total knee arthroplasty: Doing well postoperatively.    Pain Relief: some relief    Continues current post-op course    Activity: up with assistance    Weight Bearing: WBAT     LOS: 0 days     Subjective     Post-Operative Day: 1 post-right total knee arthroplasty  Systemic or Specific Complaints: No Complaints    Objective     Vital signs in last 24 hours:  Vitals:    03/17/25 1715 03/17/25 1915 03/18/25 0033 03/18/25 0448   BP: 131/68 99/56 (!) 82/50 (!) 69/48   BP Location: Right arm Right arm Left arm Right arm   Patient Position: Sitting Lying Sitting Sitting   Pulse: 61 91 66 61   Resp: 16 18 18 18   Temp: 97.3 °F (36.3 °C) 97.3 °F (36.3 °C) 97.9 °F (36.6 °C) 97.5 °F (36.4 °C)   TempSrc: Oral Oral Oral Oral   SpO2: 99% 97% 97% 96%   Weight:       Height:            General: alert, appears stated age, and cooperative   Neurovascular: Tibial nerve: Intact, Superficial peroneal nerve: Intact, and Deep peroneal nerve: Intact  Capillary refill: Normal   Wound: Wound clean and dry no evidence of infection.   Range of Motion: Limited flexsion and Limited extension   DVT Exam: No evidence of DVT seen on physical exam.      WBC   Date Value Ref Range Status   03/18/2025 10.44 3.40 - 10.80 10*3/mm3 Final     RBC   Date Value Ref Range Status   03/18/2025 2.90 (L) 3.77 - 5.28 10*6/mm3 Final     Hemoglobin   Date Value Ref Range Status   03/18/2025 9.8 (L) 12.0 - 15.9 g/dL Final     Hematocrit   Date Value Ref Range Status   03/18/2025 28.5 (L) 34.0 - 46.6 % Final     MCV   Date Value Ref Range Status   03/18/2025 98.3 (H) 79.0 - 97.0 fL Final     MCH   Date Value Ref Range Status   03/18/2025 33.8 (H) 26.6 - 33.0 pg Final     MCHC   Date Value Ref Range Status   03/18/2025 34.4 31.5 - 35.7 g/dL Final     RDW   Date Value Ref Range Status   03/18/2025 12.5 12.3 - 15.4 %  "Final     RDW-SD   Date Value Ref Range Status   03/18/2025 44.8 37.0 - 54.0 fl Final     MPV   Date Value Ref Range Status   03/18/2025 10.8 6.0 - 12.0 fL Final     Platelets   Date Value Ref Range Status   03/18/2025 227 140 - 450 10*3/mm3 Final     Neutrophil %   Date Value Ref Range Status   03/18/2025 78.5 (H) 42.7 - 76.0 % Final     Lymphocyte %   Date Value Ref Range Status   03/18/2025 11.9 (L) 19.6 - 45.3 % Final     Monocyte %   Date Value Ref Range Status   03/18/2025 8.7 5.0 - 12.0 % Final     Eosinophil %   Date Value Ref Range Status   03/18/2025 0.0 (L) 0.3 - 6.2 % Final     Basophil %   Date Value Ref Range Status   03/18/2025 0.3 0.0 - 1.5 % Final     Immature Grans %   Date Value Ref Range Status   03/18/2025 0.6 (H) 0.0 - 0.5 % Final     Neutrophils, Absolute   Date Value Ref Range Status   03/18/2025 8.20 (H) 1.70 - 7.00 10*3/mm3 Final     Lymphocytes, Absolute   Date Value Ref Range Status   03/18/2025 1.24 0.70 - 3.10 10*3/mm3 Final     Monocytes, Absolute   Date Value Ref Range Status   03/18/2025 0.91 (H) 0.10 - 0.90 10*3/mm3 Final     Eosinophils, Absolute   Date Value Ref Range Status   03/18/2025 0.00 0.00 - 0.40 10*3/mm3 Final     Basophils, Absolute   Date Value Ref Range Status   03/18/2025 0.03 0.00 - 0.20 10*3/mm3 Final     Immature Grans, Absolute   Date Value Ref Range Status   03/18/2025 0.06 (H) 0.00 - 0.05 10*3/mm3 Final     nRBC   Date Value Ref Range Status   03/18/2025 0.0 0.0 - 0.2 /100 WBC Final        Basic Metabolic Panel    Sodium Sodium   Date Value Ref Range Status   03/18/2025 136 136 - 145 mmol/L Final      Potassium Potassium   Date Value Ref Range Status   03/18/2025 5.2 3.5 - 5.2 mmol/L Final      Chloride Chloride   Date Value Ref Range Status   03/18/2025 102 98 - 107 mmol/L Final      Bicarbonate No results found for: \"PLASMABICARB\"   BUN BUN   Date Value Ref Range Status   03/18/2025 15 8 - 23 mg/dL Final      Creatinine Creatinine   Date Value Ref Range Status " "  03/18/2025 0.74 0.57 - 1.00 mg/dL Final      Calcium Calcium   Date Value Ref Range Status   03/18/2025 8.3 (L) 8.6 - 10.5 mg/dL Final      Glucose      No components found for: \"GLUCOSE.*\"      XR Knee 1 or 2 View Right   Final Result   Impression:   Expected postoperative changes from total knee arthroplasty with patella resurfacing         Electronically Signed: Felice Jacobsen MD     3/17/2025 2:11 PM EDT     Workstation ID: [id number]           "

## 2025-03-18 NOTE — PLAN OF CARE
Goal Outcome Evaluation:           Progress: improving  Outcome Evaluation: Pt is A & O x4. Pt had a right total knee done yesterday. PT was able to work with PT and OT today before discharging. Pt is voiding with no issues. Pt had PT scheduled tomorrow at [facility] at 1030. Pt's pain has been controlled with PRN pain medication throughout the day. Pt is going home with her medication and DME. Pt is going home with the help of her daughter.

## 2025-03-18 NOTE — THERAPY EVALUATION
Patient Name: Rosaura Nelson  : 1955    MRN: [id number]                              Today's Date: 3/18/2025       Admit Date: 3/17/2025    Visit Dx:     ICD-10-CM ICD-9-CM   1. Difficulty in walking  R26.2 719.7   2. Osteoarthritis of right knee, unspecified osteoarthritis type  M17.11 715.96     Patient Active Problem List   Diagnosis    Right knee pain    Status post total knee replacement, left    Primary osteoarthritis of right knee    Arthritis    Cervical spondylosis    DDD (degenerative disc disease), cervical    Cervical stenosis of spinal canal    Diabetes    Essential hypertension    High cholesterol    Irritable bowel syndrome    Seasonal allergic rhinitis    Neck pain    Headache    Limb swelling    Osteoarthritis of right knee    OA (osteoarthritis) of knee     Past Medical History:   Diagnosis Date    Arthritis     Bowel disease     Cervicalgia 10/04/2013    Cholelithiasis 2018    Gallbladder removed     Diabetes     Disease of thyroid gland     Diverticulitis     Diverticulitis of colon     Essential hypertension     GERD (gastroesophageal reflux disease)     Headache     High cholesterol     Hyperlipemia     Irritable bowel syndrome     Lactose intolerance     Limb swelling     Neck pain     Palpitations     PT DENIES RECENT PALPITATIONS    PONV (postoperative nausea and vomiting)     Seasonal allergies     Sprain and strain      Past Surgical History:   Procedure Laterality Date    APPENDECTOMY      BUNIONECTOMY      CHOLECYSTECTOMY      COLONOSCOPY  2019    ENDOSCOPY  2019    HYSTERECTOMY      KNEE SURGERY Left     OTHER SURGICAL HISTORY Left     KNEE REPLACED AND REVISED    SHOULDER SURGERY Bilateral     TOTAL KNEE ARTHROPLASTY Right 3/17/2025    Procedure: RIGHT TOTAL KNEE ARTHROPLASTY;  Surgeon: Hanh Nazario MD;  Location: [facility] MAIN OR;  Service: Orthopedics;  Laterality: Right;    TUBAL ABDOMINAL LIGATION      UPPER GASTROINTESTINAL ENDOSCOPY        General  Information       Row Name 03/18/25 0942 03/18/25 0936       OT Time and Intention    Document Type therapy note (daily note)  -PG evaluation  -PG    Mode of Treatment individual therapy;occupational therapy  -PG individual therapy;occupational therapy  -PG      Row Name 03/18/25 0936          General Information    Prior Level of Function independent:;ADL's;transfer;driving  -PG     Existing Precautions/Restrictions fall  -PG     Barriers to Rehab none identified  -PG       Row Name 03/18/25 0936          Occupational Profile    Reason for Services/Referral (Occupational Profile) Patient is a 69-year-old female admitted for right total knee arthroplasty.  Patient is being evaluated by Occupational Therapy due to recent decline in ADL function.  No previous OT services identified  -PG       Row Name 03/18/25 0936          Living Environment    Current Living Arrangements home  -PG       Row Name 03/18/25 0936          Cognition    Orientation Status (Cognition) oriented x 3  -PG       Row Name 03/18/25 0936          Safety Issues/Impairments Affecting Functional Mobility    Impairments Affecting Function (Mobility) balance;strength;pain;range of motion (ROM)  -PG               User Key  (r) = Recorded By, (t) = Taken By, (c) = Cosigned By      Initials Name Provider Type    PG Nader Negron, OT Occupational Therapist                     Mobility/ADL's       Row Name 03/18/25 0942 03/18/25 0938       Transfers    Transfers bed-chair transfer  -PG bed-chair transfer  -PG      Row Name 03/18/25 0942 03/18/25 0938       Bed-Chair Transfer    Bed-Chair [place] (Transfers) contact guard  -PG contact guard  -PG    Assistive Device (Bed-Chair Transfers) -- walker, front-wheeled  -PG      Row Name 03/18/25 0938          Activities of Daily Living    BADL Assessment/Intervention bathing;upper body dressing;lower body dressing;grooming;toileting  -PG       Row Name 03/18/25 0942 03/18/25 0938       Bathing  Assessment/Intervention    [place] Level (Bathing) bathing skills;minimum assist (75% patient effort)  -PG bathing skills;minimum assist (75% patient effort)  -PG    Position (Bathing) supported standing  -PG supported standing  -PG      Row Name 03/18/25 0942 03/18/25 0938       Upper Body Dressing Assessment/Training    [place] Level (Upper Body Dressing) upper body dressing skills;set up  -PG upper body dressing skills;set up  -PG      Row Name 03/18/25 0942 03/18/25 0938       Lower Body Dressing Assessment/Training    [place] Level (Lower Body Dressing) lower body dressing skills;minimum assist (75% patient effort)  -PG lower body dressing skills;minimum assist (75% patient effort)  -PG    Position (Lower Body Dressing) supported standing  -PG supported standing  -PG      Row Name 03/18/25 09 03/18/25 0938       Grooming Assessment/Training    [place] Level (Grooming) grooming skills;independent  -PG grooming skills;independent  -PG      Row Name 03/18/25 0942 03/18/25 0938       Toileting Assessment/Training    [place] Level (Toileting) toileting skills;contact guard assist  -PG toileting skills;contact guard assist  -PG    Position (Toileting) supported standing  -PG supported standing  -PG              User Key  (r) = Recorded By, (t) = Taken By, (c) = Cosigned By      Initials Name Provider Type    PG Nader Negron OT Occupational Therapist                   Obj/Interventions       Row Name 03/18/25 0939          Sensory Assessment (Somatosensory)    Sensory Assessment (Somatosensory) sensation intact  -PG       Row Name 03/18/25 0939          Vision Assessment/Intervention    Visual Impairment/Limitations WFL  -PG       Row Name 03/18/25 0939          Range of Motion Comprehensive    General Range of Motion no range of motion deficits identified  -PG       Row Name 03/18/25 0939          Strength Comprehensive (MMT)    General Manual Muscle Testing (MMT) Assessment no strength  deficits identified  -PG       Row Name 03/18/25 0939          Motor Skills    Motor Skills coordination;functional endurance  -PG     Coordination WFL  -PG     Functional Endurance good  minus  -PG               User Key  (r) = Recorded By, (t) = Taken By, (c) = Cosigned By      Initials Name Provider Type    PG Nader Negron, OT Occupational Therapist                   Goals/Plan       Row Name 03/18/25 0940          Transfer Goal 1 (OT)    Activity/Assistive Device (Transfer Goal 1, OT) transfers, all  -PG     [place] Level/Cues Needed (Transfer Goal 1, OT) modified independence  -PG     Time Frame (Transfer Goal 1, OT) long term goal (LTG);10 days  -PG       Row Name 03/18/25 0940          Bathing Goal 1 (OT)    Activity/Device (Bathing Goal 1, OT) bathing skills, all  -PG     [place] Level/Cues Needed (Bathing Goal 1, OT) modified independence  -PG     Time Frame (Bathing Goal 1, OT) long term goal (LTG);10 days  -PG       Row Name 03/18/25 0940          Dressing Goal 1 (OT)    Activity/Device (Dressing Goal 1, OT) dressing skills, all  -PG     [place]/Cues Needed (Dressing Goal 1, OT) modified independence  -PG     Time Frame (Dressing Goal 1, OT) long term goal (LTG);10 days  -PG       Row Name 03/18/25 0940          Toileting Goal 1 (OT)    Activity/Device (Toileting Goal 1, OT) toileting skills, all  -PG     [place] Level/Cues Needed (Toileting Goal 1, OT) modified independence  -PG     Time Frame (Toileting Goal 1, OT) long term goal (LTG);10 days  -PG       Row Name 03/18/25 0940          Grooming Goal 1 (OT)    Activity/Device (Grooming Goal 1, OT) grooming skills, all  -PG     [place] (Grooming Goal 1, OT) modified independence  -PG     Time Frame (Grooming Goal 1, OT) long term goal (LTG);10 days  -PG       Row Name 03/18/25 0940          Therapy Assessment/Plan (OT)    Planned Therapy Interventions (OT) BADL retraining;occupation/activity based interventions;patient/caregiver  education/training;transfer/mobility retraining  -PG               User Key  (r) = Recorded By, (t) = Taken By, (c) = Cosigned By      Initials Name Provider Type    PG Nader Negron OT Occupational Therapist                   Clinical Impression       Row Name 03/18/25 0939          Pain Assessment    Pretreatment Pain Rating 0/10 - no pain  -PG     Posttreatment Pain Rating 0/10 - no pain  -PG       Row Name 03/18/25 0939          Plan of Care Review    Plan of Care Reviewed With patient  -PG     Progress no change  -PG     Outcome Evaluation Patient presents with limitations affecting strength, activity tolerance, and balance impacting patient's ability to return home safely and independently.  The skills of a therapist will be required to safely and effectively implement the following treatment plan to restore maximal level of function  -PG       Row Name 03/18/25 0939          Therapy Assessment/Plan (OT)    Patient/Family Therapy Goal Statement (OT) Walk without pain  -PG     Rehab Potential (OT) good  -PG     Criteria for Skilled Therapeutic Interventions Met (OT) yes;meets criteria;skilled treatment is necessary  -PG     Therapy Frequency (OT) 5 times/wk  -PG       Row Name 03/18/25 0939          Therapy Plan Review/Discharge Plan (OT)    Anticipated Discharge Disposition (OT) home with outpatient therapy services  -PG               User Key  (r) = Recorded By, (t) = Taken By, (c) = Cosigned By      Initials Name Provider Type    PG Nader Negron OT Occupational Therapist                   Outcome Measures       Row Name 03/18/25 0979          How much help from another is currently needed...    Putting on and taking off regular lower body clothing? 3  -PG     Bathing (including washing, rinsing, and drying) 3  -PG     Toileting (which includes using toilet bed pan or urinal) 3  -PG     Putting on and taking off regular upper body clothing 4  -PG     Taking care of personal grooming (such as brushing  teeth) 4  -PG     Eating meals 4  -PG     AM-PAC 6 Clicks Score (OT) 21  -PG       Row Name 03/18/25 0940          Functional Assessment    Outcome Measure Options AM-PAC 6 Clicks Daily Activity (OT);Optimal Instrument  -PG       Row Name 03/18/25 0940          Optimal Instrument    Optimal Instrument Optimal - 3  -PG     Bending/Stooping 2  -PG     Standing 2  -PG     Reaching 1  -PG     From the list, choose the 3 activities you would most like to be able to do without any difficulty Bending/stooping;Standing;Reaching  -PG     Total Score Optimal - 3 5  -PG               User Key  (r) = Recorded By, (t) = Taken By, (c) = Cosigned By      Initials Name Provider Type    PG Nader Negron OT Occupational Therapist                    Occupational Therapy Education       Title: PT OT SLP Therapies (Done)       Topic: Occupational Therapy (Done)       Point: ADL training (Done)       Learning Progress Summary            Patient Acceptance, D,E, DU by PG at 3/18/2025 0941                      Point: Home exercise program (Done)       Learning Progress Summary            Patient Acceptance, D,E, DU by PG at 3/18/2025 0941                      Point: Precautions (Done)       Learning Progress Summary            Patient Acceptance, D,E, DU by PG at 3/18/2025 0941                      Point: Body mechanics (Done)       Learning Progress Summary            Patient Acceptance, D,E, DU by PG at 3/18/2025 0941                                      User Key       Initials Effective Dates Name Provider Type Discipline    PG 06/16/21 -  Nader Negron OT Occupational Therapist OT                  OT Recommendation and Plan  Planned Therapy Interventions (OT): BADL retraining, occupation/activity based interventions, patient/caregiver education/training, transfer/mobility retraining  Therapy Frequency (OT): 5 times/wk  Plan of Care Review  Plan of Care Reviewed With: patient  Progress: no change  Outcome Evaluation: Patient presents  with limitations affecting strength, activity tolerance, and balance impacting patient's ability to return home safely and independently.  The skills of a therapist will be required to safely and effectively implement the following treatment plan to restore maximal level of function     Time Calculation:   Evaluation Complexity (OT)  Review Occupational Profile/Medical/Therapy History Complexity: brief/low complexity  Assessment, Occupational Performance/Identification of Deficit Complexity: 3-5 performance deficits  Clinical Decision Making Complexity (OT): problem focused assessment/low complexity  Overall Complexity of Evaluation (OT): low complexity     Time Calculation- OT       Row Name 03/18/25 0942             Time Calculation- OT    OT Received On 03/18/25  -PG      OT Goal Re-Cert Due Date 03/27/25  -PG         Timed Charges    02089 - OT Therapeutic Activity Minutes 10  -PG      85097 - OT Self Care/Mgmt Minutes 15  -PG         Untimed Charges    OT Eval/Re-eval Minutes 25  -PG         Total Minutes    Timed Charges Total Minutes 25  -PG      Untimed Charges Total Minutes 25  -PG       Total Minutes 50  -PG                User Key  (r) = Recorded By, (t) = Taken By, (c) = Cosigned By      Initials Name Provider Type    PG Nader Negron OT Occupational Therapist                  Therapy Charges for Today       Code Description Service Date Service Provider Modifiers Qty    42278420662 HC OT THERAPEUTIC ACT EA 15 MIN 3/18/2025 Nader Negron OT GO 1    09367570153 HC OT SELF CARE/MGMT/TRAIN EA 15 MIN 3/18/2025 Nader Negron OT GO 1    16779601594 HC OT EVAL LOW COMPLEXITY 2 3/18/2025 Nader Negron OT GO 1                 Nader Negron OT  3/18/2025

## 2025-03-18 NOTE — PLAN OF CARE
Goal Outcome Evaluation:  Plan of Care Reviewed With: patient           Outcome Evaluation: Pt complained of pain once during the shift, which was managed with oral PRN medication. VSS. Pt was able to ambulate 200 ft. Pt is expecting to use [facility] outpatient therapy upon discharge.

## 2025-03-18 NOTE — SIGNIFICANT NOTE
03/18/25 0749   Plan   Final Discharge Disposition Code 01 - home or self-care   Final Note [facility] Outpatient PT. Appt: 03/19/25 at 10:30AM.

## 2025-04-01 ENCOUNTER — OFFICE VISIT (OUTPATIENT)
Dept: ORTHOPEDIC SURGERY | Facility: CLINIC | Age: 70
End: 2025-04-01
Payer: MEDICARE

## 2025-04-01 VITALS — OXYGEN SATURATION: 95 % | HEART RATE: 101 BPM | DIASTOLIC BLOOD PRESSURE: 70 MMHG | SYSTOLIC BLOOD PRESSURE: 107 MMHG

## 2025-04-01 DIAGNOSIS — Z96.651 S/P TOTAL KNEE ARTHROPLASTY, RIGHT: Primary | ICD-10-CM

## 2025-04-01 DIAGNOSIS — M25.561 RIGHT KNEE PAIN, UNSPECIFIED CHRONICITY: ICD-10-CM

## 2025-04-01 PROCEDURE — 99024 POSTOP FOLLOW-UP VISIT: CPT | Performed by: PHYSICIAN ASSISTANT

## 2025-04-01 RX ORDER — ASPIRIN 325 MG
325 TABLET ORAL DAILY
COMMUNITY

## 2025-04-01 NOTE — PATIENT INSTRUCTIONS
X-rays taken and reviewed, showing intact hardware.    Staples removed in office and Steri-Strips applied. Patient educated on incision care. You may shower but do not soak or submerge in water until incision is fully healed.  Do not apply creams or lotions over the incision. Please allow Steri-Strips to fall off on their own within 7 to 10 days.    Continue icing and elevation of the knee as needed to help with pain and swelling.  You may ice knee for approximately 15-20 minutes, three times daily, and as needed.   Continue PT to progress ROM, strength, and weightbearing status. Important to do home exercises in addition to PT. Continue use of walker until recommended by PT to discontinue.     We discussed the importance of weaning from narcotic medications.  She will contact the office if she needs any refills but is not taking it much right now.  Patient will continue aspirin for DVT prophylaxis until prescription completed.      Follow-up in 4 weeks. Repeat x-rays not needed at this visit.  Please call with questions or concerns.

## 2025-04-01 NOTE — PROGRESS NOTES
Chief Complaint  Follow-up of the Right Knee    Subjective          History of Present Illness     Rosaura Nelson is a 69 y.o. female presents today for a follow up of right knee. Patient is 2 weeks post op right total knee arthroplasty performed by Dr. Nazario on 3/17/2025.  Patient presents today with use of a walker.  She states that the knee is doing okay.  She did have some issues over the weekend and increased swelling.  Physical therapy sent her to the ER yesterday where she had an ultrasound at [facility] ruling out lower extremity DVT.  She has swelling, bruising, and tenderness of the calf.  She is icing frequently.  She is only taken the pain medication at night to sleep.  She has finished her Eliquis and is now on the full dose aspirin.    Patient denies redness, drainage, or complications from incision. Denies fever or chills.     Allergies   Allergen Reactions    Latex Itching, Rash and Unknown - High Severity        Social History     Socioeconomic History    Marital status:    Tobacco Use    Smoking status: Never     Passive exposure: Past    Smokeless tobacco: Never   Vaping Use    Vaping status: Never Used   Substance and Sexual Activity    Alcohol use: No    Drug use: Never    Sexual activity: Defer     Partners: Male        Tobacco Use: Low Risk  (4/1/2025)    Patient History     Smoking Tobacco Use: Never     Smokeless Tobacco Use: Never     Passive Exposure: Past     Patient reports that they are a nonsmoker; cessation education not applicable.     I reviewed the patient's chief complaint, history of present illness, review of systems, past medical history, surgical history, family history, social history, medications, and allergy list.     REVIEW OF SYSTEMS    Constitutional: Denies fevers, chills, weight loss  Cardiovascular: Denies chest pain, shortness of breath  Skin: Denies rashes, acute skin changes  Neurologic: Denies headache, loss of consciousness  MSK: Right  knee pain      Objective   Vital Signs:   /70   Pulse 101   SpO2 95%     There is no height or weight on file to calculate BMI.    Physical Exam    General: Alert, no acute distress  Gait: Mildly antalgic with use of walker  Right lower extremity: Staples and sutures removed today without complications.  Anterior knee incision is healing appropriately.  2 distal incisions are well-healing.  No active drainage or redness noted.  No concerning signs for infection.  Moderate knee effusion. Knee extensor mechanism intact. Hip flexion intact.  85 degrees flexion.  -3 degrees extension. Knee stable to varus and valgus stress. Calf soft, nontender. Demonstrates active ankle plantarflexion and dorsiflexion. Sensation intact over the dorsal and plantar foot. Palpable pedal pulses.       Imaging Results (Most Recent)       Procedure Component Value Units Date/Time    XR Knee 3 View Right [[id number]] Resulted: 04/01/25 1329     Updated: 04/01/25 1329    Narrative:      X-Ray Report:  Study: X-rays ordered, taken in the office, and reviewed today.   Site: Right knee Xray  Indication: Right total knee arthroplasty follow up.   View: AP/Lateral, Standing, and [place] view(s)  Findings: Intact right total knee arthroplasty. No evidence of hardware   malfunction, complication or loosening. No periprosthetic fractures   visualized. Patella is midline tracking on sunrise view.   Prior studies available for comparison: yes                      Assessment and Plan    Diagnoses and all orders for this visit:    1. S/P total knee arthroplasty, right (Primary)  -     XR Knee 3 View Right    2. Right knee pain, unspecified chronicity          Call or return if symptoms worsen or patient has any concerns.       Follow Up   Return in about 4 weeks (around 4/29/2025).  Patient Instructions   X-rays taken and reviewed, showing intact hardware.    Staples removed in office and Steri-Strips applied. Patient educated on incision care. You  may shower but do not soak or submerge in water until incision is fully healed.  Do not apply creams or lotions over the incision. Please allow Steri-Strips to fall off on their own within 7 to 10 days.    Continue icing and elevation of the knee as needed to help with pain and swelling.  You may ice knee for approximately 15-20 minutes, three times daily, and as needed.   Continue PT to progress ROM, strength, and weightbearing status. Important to do home exercises in addition to PT. Continue use of walker until recommended by PT to discontinue.     We discussed the importance of weaning from narcotic medications.  She will contact the office if she needs any refills but is not taking it much right now.  Patient will continue aspirin for DVT prophylaxis until prescription completed.      Follow-up in 4 weeks. Repeat x-rays not needed at this visit.  Please call with questions or concerns.    Patient was given instructions and counseling regarding her condition or for health maintenance advice. Please see specific information pulled into the AVS if appropriate.     Aliyah Castro PA-C  04/01/25  13:42 EDT

## 2025-04-29 ENCOUNTER — OFFICE VISIT (OUTPATIENT)
Dept: ORTHOPEDIC SURGERY | Facility: CLINIC | Age: 70
End: 2025-04-29
Payer: MEDICARE

## 2025-04-29 VITALS — HEIGHT: 68 IN | WEIGHT: 147 LBS | BODY MASS INDEX: 22.28 KG/M2

## 2025-04-29 DIAGNOSIS — Z96.651 S/P TOTAL KNEE ARTHROPLASTY, RIGHT: Primary | ICD-10-CM

## 2025-04-29 DIAGNOSIS — M25.561 RIGHT KNEE PAIN, UNSPECIFIED CHRONICITY: ICD-10-CM

## 2025-04-29 RX ORDER — MELOXICAM 7.5 MG/1
1 TABLET ORAL DAILY
COMMUNITY
Start: 2025-04-02

## 2025-04-29 NOTE — PATIENT INSTRUCTIONS
Patient is doing very well. Advised to continue PT to completion to progress strength and ROM.  Continue home exercises, including stretches to push flexion and extension instructed today.    Continue icing knee as needed up to 4 times daily for no longer than 15-20 mins at a time.     Discussed importance of weaning off narcotics.  She is not taking them currently    Follow-up in 3 weeks to check ROM.  No x-rays.  We discussed possibility of Dynasplint versus the manipulation  Call with questions/concerns.

## 2025-04-29 NOTE — PROGRESS NOTES
Chief Complaint  Follow-up and Pain of the Right Knee    Subjective          History of Present Illness     Rosaura Nelson is a 69 y.o. female presents today for a follow up of  right knee. Patient is 6 weeks post op right total knee arthroplasty performed by Dr. Nazario on 3/17/2025.  She has had a lot of swelling postop and had duplex ultrasound at [facility] ruling out a DVT early postop.  She is to had swelling and bruising and tenderness of the cast at last visit and had finished her Eliquis was on the aspirin.      Patient presents today with out use of AD    History of Present Illness  She reports a reduction in knee swelling but has noticed persistent ankle swelling. She continues to sleep on the couch and has been attending physical therapy sessions twice a week ([facility] cannot get her in more than that due to staff on maternity leave), where her knee flexion has improved from 91 to 96 degrees. Currently, she is 4 weeks post-surgery. Driving has not been attempted, and she has not been using a walker or cane for mobility. The initial prescription of pain medication has been completed, and Aleve is occasionally taken at night.  She does not request refill on pain medicine    SOCIAL HISTORY    Exercise: Physical therapy twice a week at [facility]      Patient denies redness, drainage, or complications from incision. Denies fever or chills.     Allergies   Allergen Reactions    Latex Itching, Rash and Unknown - High Severity        Social History     Socioeconomic History    Marital status:    Tobacco Use    Smoking status: Never     Passive exposure: Past    Smokeless tobacco: Never   Vaping Use    Vaping status: Never Used   Substance and Sexual Activity    Alcohol use: No    Drug use: Never    Sexual activity: Defer     Partners: Male        Tobacco Use: Low Risk  (4/30/2025)    Patient History     Smoking Tobacco Use: Never     Smokeless Tobacco Use: Never     Passive Exposure: Past     Patient reports that they  "are a nonsmoker; cessation education not applicable.     I reviewed the patient's chief complaint, history of present illness, review of systems, past medical history, surgical history, family history, social history, medications, and allergy list.     REVIEW OF SYSTEMS    Constitutional: Denies fevers, chills, weight loss  Cardiovascular: Denies chest pain, shortness of breath  Skin: Denies rashes, acute skin changes  Neurologic: Denies headache, loss of consciousness  MSK: Right knee pain      Objective   Vital Signs:   Ht 172.7 cm (68\")   Wt 66.7 kg (147 lb)   BMI 22.35 kg/m²     Body mass index is 22.35 kg/m².    Physical Exam    Physical Exam  Musculoskeletal:  Left Knee: The left knee incision appears to be healing well. Knee flexion is between 85 and 90 degrees.    General: Alert, no acute distress  Gait: Mildly antalgic without use of AD  Right lower extremity: Incision well-healing.  No concerning signs for infection.  Mild knee effusion. Knee extensor mechanism intact. Hip flexion intact.  Dust under 95 degrees flexion.  -3 degrees extension. Knee stable to varus and valgus stress. Calf soft, nontender. Demonstrates active ankle plantarflexion and dorsiflexion. Sensation intact over the dorsal and plantar foot. Palpable pedal pulses.               Assessment and Plan    Diagnoses and all orders for this visit:    1. S/P total knee arthroplasty, right (Primary)    2. Right knee pain, unspecified chronicity        Assessment & Plan  Home exercises demonstrated to really push knee flexion as well as knee extension.        Call or return if symptoms worsen or patient has any concerns.       Follow Up   Return in about 3 weeks (around 5/20/2025).  Patient Instructions   Patient is doing very well. Advised to continue PT to completion to progress strength and ROM.  Continue home exercises, including stretches to push flexion and extension instructed today.    Continue icing knee as needed up to 4 times daily " for no longer than 15-20 mins at a time.     Discussed importance of weaning off narcotics.  She is not taking them currently    Follow-up in 3 weeks to check ROM.  No x-rays.  We discussed possibility of Dynasplint versus the manipulation  Call with questions/concerns.      Patient was given instructions and counseling regarding her condition or for health maintenance advice. Please see specific information pulled into the AVS if appropriate.     Aliyah Castro PA-C  04/30/25  08:33 EDT    Patient or patient representative verbalized consent for the use of Ambient Listening during the visit with  Aliyah Castro PA-C for chart documentation. 4/30/2025  08:34 EDT

## 2025-05-09 ENCOUNTER — OFFICE VISIT (OUTPATIENT)
Dept: ORTHOPEDIC SURGERY | Facility: CLINIC | Age: 70
End: 2025-05-09
Payer: MEDICARE

## 2025-05-09 VITALS
SYSTOLIC BLOOD PRESSURE: 142 MMHG | BODY MASS INDEX: 22.29 KG/M2 | DIASTOLIC BLOOD PRESSURE: 80 MMHG | OXYGEN SATURATION: 97 % | HEART RATE: 70 BPM | HEIGHT: 68 IN | WEIGHT: 147.05 LBS

## 2025-05-09 DIAGNOSIS — Z96.651 S/P TOTAL KNEE ARTHROPLASTY, RIGHT: Primary | ICD-10-CM

## 2025-05-09 DIAGNOSIS — M25.561 RIGHT KNEE PAIN, UNSPECIFIED CHRONICITY: ICD-10-CM

## 2025-05-09 RX ORDER — OXYBUTYNIN CHLORIDE 5 MG/1
TABLET, EXTENDED RELEASE ORAL
COMMUNITY
Start: 2025-04-02

## 2025-05-09 RX ORDER — LEVOTHYROXINE SODIUM 50 UG/1
1 TABLET ORAL DAILY
COMMUNITY
Start: 2025-04-02

## 2025-05-09 NOTE — PROGRESS NOTES
Chief Complaint  Follow-up and Pain of the Right Knee (Patient fell)    Subjective          History of Present Illness     History of Present Illness  The patient is a 69-year-old female who presents for a follow-up on her right knee. She is almost 8 weeks postoperative from a right total knee arthroplasty performed by Dr. Nazario on 03/17/2025. Postoperatively, she was evaluated at [facility] for DVT with a duplex ultrasound due to swelling and bruising. During her last visit, she was experiencing difficulty in progressing her knee flexion, although she was only 4 weeks postoperative. She is here for a 3-week check to assess range of motion and consider a DynaSplint.    She recently experienced a fall at the pharmacy, resulting in injuries to her chin, eye, and both knees. The fall was attributed to her not lifting her leg sufficiently while reaching for the door. She landed on her face and both knees, causing soreness in her knees. A nurse was called to evaluate her condition, and it was determined that she had sustained a minor cut on the left knee with some bleeding of the lip and bruising of the face, but the right knee just had some very mild bruising that has already resolved. She has been actively participating in physical therapy, which has included stretching exercises and resistance training. She reports an improvement in her condition following these sessions, particularly with some new stretches/exercises they began just recently. She has a scheduled appointment with me on 05/22/2025, but is here earlier today for x-rays given the fall. She has discontinued her pain medication and has a physical therapy session scheduled for today.      Patient denies redness, drainage, or complications from incision. Denies fever or chills.     Allergies   Allergen Reactions    Latex Itching, Rash and Unknown - High Severity        Social History     Socioeconomic History    Marital status:   "  Tobacco Use    Smoking status: Never     Passive exposure: Past    Smokeless tobacco: Never   Vaping Use    Vaping status: Never Used   Substance and Sexual Activity    Alcohol use: No    Drug use: Never    Sexual activity: Defer     Partners: Male        Tobacco Use: Low Risk  (5/9/2025)    Patient History     Smoking Tobacco Use: Never     Smokeless Tobacco Use: Never     Passive Exposure: Past     Patient reports that they are a nonsmoker; cessation education not applicable.     I reviewed the patient's chief complaint, history of present illness, review of systems, past medical history, surgical history, family history, social history, medications, and allergy list.     REVIEW OF SYSTEMS    Constitutional: Denies fevers, chills, weight loss  Cardiovascular: Denies chest pain, shortness of breath  Skin: Denies rashes, acute skin changes  Neurologic: Denies headache, loss of consciousness  MSK: Right knee pain      Objective   Vital Signs:   /80   Pulse 70   Ht 172.7 cm (68\")   Wt 66.7 kg (147 lb 0.8 oz)   SpO2 97%   BMI 22.36 kg/m²     Body mass index is 22.36 kg/m².    Physical Exam    Physical Exam  Head: No fractures noted around the hardware.    Musculoskeletal:  Gait: Patient is walking better.  Right Knee: No fractures noted around the hardware. Right knee flexion is at 90 degrees.  Left Knee: No fractures noted around the hardware.    General: Alert, no acute distress  Gait: Mildly antalgic with use of AD  Right lower extremity: Surgical scar.  No concerning signs for infection.  Mild knee effusion. Knee extensor mechanism intact. Hip flexion intact.  95 degrees flexion.  -3 degrees extension. Knee stable to varus and valgus stress. Calf soft, nontender. Demonstrates active ankle plantarflexion and dorsiflexion. Sensation intact over the dorsal and plantar foot. Palpable pedal pulses.       Imaging Results (Most Recent)       Procedure Component Value Units Date/Time    XR Knee 3 View Right " [[id number]] Resulted: 05/09/25 1002     Updated: 05/09/25 1002    Narrative:      X-Ray Report:  Study: X-rays ordered, taken in the office, and reviewed today.   Site: Right knee Xray  Indication: Right total knee arthroplasty follow up.   View: AP/Lateral, Standing, and Putney view(s)  Findings: Intact right total knee arthroplasty. No evidence of hardware   malfunction, complication or loosening. No periprosthetic fractures   visualized. Patella is midline tracking on sunrise view.   Prior studies available for comparison: yes                      Assessment and Plan    Diagnoses and all orders for this visit:    1. S/P total knee arthroplasty, right (Primary)  -     XR Knee 3 View Right    2. Right knee pain, unspecified chronicity        Assessment & Plan  1. Postoperative status following right total knee arthroplasty:    A Dynasplint will be ordered to aid in improving knee flexion. Continue with aggressive physical therapy. If there is no significant improvement by the next visit, manipulation may be considered. If flexion improves to 110 degrees by 05/22/2025, the appointment may not be necessary.    Follow-up  Scheduled for a follow-up visit on 05/22/2025.        Call or return if symptoms worsen or patient has any concerns.       Follow Up   No follow-ups on file.  There are no Patient Instructions on file for this visit.  Patient was given instructions and counseling regarding her condition or for health maintenance advice. Please see specific information pulled into the AVS if appropriate.     Aliyah Castro PA-C  05/09/25  12:09 EDT    Patient or patient representative verbalized consent for the use of Ambient Listening during the visit with  Aliyah Castro PA-C for chart documentation. 5/9/2025  12:09 EDT

## 2025-05-12 NOTE — TELEPHONE ENCOUNTER
When r/s patient's appt due to [facility] being out of office, she expressed she needs rx refills. Please advise.

## 2025-05-13 RX ORDER — COLESTIPOL HYDROCHLORIDE 1 G/1
1 TABLET ORAL 2 TIMES DAILY
Qty: 180 TABLET | Refills: 3 | Status: SHIPPED | OUTPATIENT
Start: 2025-05-13

## 2025-05-13 RX ORDER — DICYCLOMINE HCL 20 MG
20 TABLET ORAL EVERY 6 HOURS
Qty: 120 TABLET | Refills: 3 | Status: SHIPPED | OUTPATIENT
Start: 2025-05-13

## 2025-06-10 ENCOUNTER — OFFICE VISIT (OUTPATIENT)
Dept: ORTHOPEDIC SURGERY | Facility: CLINIC | Age: 70
End: 2025-06-10
Payer: MEDICARE

## 2025-06-10 VITALS
HEIGHT: 68 IN | WEIGHT: 145.72 LBS | HEART RATE: 68 BPM | SYSTOLIC BLOOD PRESSURE: 113 MMHG | OXYGEN SATURATION: 97 % | BODY MASS INDEX: 22.09 KG/M2 | DIASTOLIC BLOOD PRESSURE: 70 MMHG

## 2025-06-10 DIAGNOSIS — Z96.651 S/P TOTAL KNEE ARTHROPLASTY, RIGHT: Primary | ICD-10-CM

## 2025-06-10 PROCEDURE — 99024 POSTOP FOLLOW-UP VISIT: CPT | Performed by: PHYSICIAN ASSISTANT

## 2025-06-10 RX ORDER — SERTRALINE HYDROCHLORIDE 25 MG/1
1 TABLET, FILM COATED ORAL DAILY
COMMUNITY
Start: 2025-05-14

## 2025-06-10 RX ORDER — LEVOTHYROXINE SODIUM 75 UG/1
1 TABLET ORAL DAILY
COMMUNITY
Start: 2025-05-14

## 2025-06-10 NOTE — PROGRESS NOTES
Chief Complaint  Follow-up of the Right Knee    Subjective          History of Present Illness     History of Present Illness  The patient is a 69-year-old female who presents for follow-up on her right knee. She is 12 weeks postoperative from a right total knee arthroplasty performed by Dr. Nazario on 03/17/2025. She is attending physical therapy at [facility] and has experienced some difficulty with range of motion. She had a fall at a pharmacy, landing on her face and both knees. She came into our clinic for evaluation and x-rays, which showed hardware was stable and intact. Range of motion was -3 to 95 degrees at that visit. Dynasplint was ordered, and she is here for 3 week ROM check.     Today She reports an improvement in her knee condition. She has been utilizing a DynaSplint, which she finds beneficial. She continues her regimen of meloxicam. She has been engaging in PT twice weekly and notes a gradual improvement in her knee's flexibility. She has recently resumed driving and has been able to complete a full rotation on a stationary bike. She also reports some ankle swelling, which she believes is improving.    SOCIAL HISTORY  Exercise: The patient rides a bike and exercises at home, and attends physical therapy twice a week.      Patient denies redness, drainage, or complications from incision. Denies fever or chills.     Allergies   Allergen Reactions    Alendronate Other (See Comments)    Lisinopril Anaphylaxis    Ibandronate Other (See Comments)    Latex Itching, Rash and Unknown - High Severity        Social History     Socioeconomic History    Marital status:    Tobacco Use    Smoking status: Never     Passive exposure: Past    Smokeless tobacco: Never   Vaping Use    Vaping status: Never Used   Substance and Sexual Activity    Alcohol use: No    Drug use: Never    Sexual activity: Defer     Partners: Male        Tobacco Use: Low Risk  (6/10/2025)    Patient History     Smoking Tobacco  "Use: Never     Smokeless Tobacco Use: Never     Passive Exposure: Past     Patient reports that they are a nonsmoker; cessation education not applicable.     I reviewed the patient's chief complaint, history of present illness, review of systems, past medical history, surgical history, family history, social history, medications, and allergy list.     REVIEW OF SYSTEMS    Constitutional: Denies fevers, chills, weight loss  Cardiovascular: Denies chest pain, shortness of breath  Skin: Denies rashes, acute skin changes  Neurologic: Denies headache, loss of consciousness  MSK: Right knee pain      Objective   Vital Signs:   /70   Pulse 68   Ht 172.7 cm (68\")   Wt 66.1 kg (145 lb 11.6 oz)   SpO2 97%   BMI 22.16 kg/m²     Body mass index is 22.16 kg/m².    Physical Exam    Physical Exam  Musculoskeletal:  Right Knee: Range of motion is -3 to 95 degrees. Improved range of motion noted with Angelina splint use.    General: Alert, no acute distress  Gait: Mildly antalgic without use of AD  Right lower extremity: Surgical scar.  No concerning signs for infection. Mild knee effusion. Knee extensor mechanism intact. Hip flexion intact. 105 degrees flexion. -2 degrees extension. Knee stable to varus and valgus stress. Calf soft, nontender. Demonstrates active ankle plantarflexion and dorsiflexion. Sensation intact over the dorsal and plantar foot. Palpable pedal pulses.        Assessment and Plan    Diagnoses and all orders for this visit:    1. S/P total knee arthroplasty, right (Primary)  -     Cancel: XR Knee 3 View Right  -     Ambulatory Referral to Physical Therapy for Evaluation & Treatment        Assessment & Plan  X-rays were obtained at last visit. Hardware was stable and intact. Patient is doing well.     Incision well healed. May apply Vitamin E, cocoa butter, etc. over incision to aid in scar appearance. Educated that numbness over the incision can still remain at this point, and should continue to improve " for up to 1 year postop. However, at the year moisés if still experiencing numbness it may not return.    Encouraged to continue home exercises for ROM and strengthening.     Continue PT and Dynasplint. Order updated.     May continue icing as needed for no more than 20 minutes at a time for comfort and inflammation.   Discussed avoiding kneeling directly on to the prosthetic and avoid deep squatting.     Discussed the need for antibiotic prophylaxis with any dental procedures following total joint replacement for life. Patient instructed to contact office if dental office is reluctant to prescribe antibiotics prior to procedure and we will prescribe them.      Patient will follow-up in 4-6 weeks with Dr. Nazario for ROM check. She is doing very well with Dynasplint, and I don't think will need manipulation. She is very apprehensive about manipulation and determined to improve with Dynasplint and PT alone.     Call or return if symptoms worsen or patient has any concerns.       Follow Up   No follow-ups on file.  There are no Patient Instructions on file for this visit.  Patient was given instructions and counseling regarding her condition or for health maintenance advice. Please see specific information pulled into the AVS if appropriate.     Aliyah Castro PA-C  06/10/25  16:44 EDT    Patient or patient representative verbalized consent for the use of Ambient Listening during the visit with  Aliyah Castro PA-C for chart documentation. 6/10/2025  16:43 EDT

## 2025-06-23 ENCOUNTER — OFFICE VISIT (OUTPATIENT)
Dept: GASTROENTEROLOGY | Facility: CLINIC | Age: 70
End: 2025-06-23
Payer: MEDICARE

## 2025-06-23 VITALS
DIASTOLIC BLOOD PRESSURE: 69 MMHG | HEIGHT: 68 IN | SYSTOLIC BLOOD PRESSURE: 118 MMHG | BODY MASS INDEX: 21.7 KG/M2 | WEIGHT: 143.2 LBS | HEART RATE: 61 BPM

## 2025-06-23 DIAGNOSIS — K86.89 PANCREATIC INSUFFICIENCY: ICD-10-CM

## 2025-06-23 DIAGNOSIS — K21.9 GASTROESOPHAGEAL REFLUX DISEASE, UNSPECIFIED WHETHER ESOPHAGITIS PRESENT: ICD-10-CM

## 2025-06-23 DIAGNOSIS — R13.10 DYSPHAGIA, UNSPECIFIED TYPE: ICD-10-CM

## 2025-06-23 DIAGNOSIS — K58.0 IRRITABLE BOWEL SYNDROME WITH DIARRHEA: Primary | ICD-10-CM

## 2025-06-23 PROCEDURE — 1159F MED LIST DOCD IN RCRD: CPT | Performed by: NURSE PRACTITIONER

## 2025-06-23 PROCEDURE — 1160F RVW MEDS BY RX/DR IN RCRD: CPT | Performed by: NURSE PRACTITIONER

## 2025-06-23 PROCEDURE — 3078F DIAST BP <80 MM HG: CPT | Performed by: NURSE PRACTITIONER

## 2025-06-23 PROCEDURE — 99214 OFFICE O/P EST MOD 30 MIN: CPT | Performed by: NURSE PRACTITIONER

## 2025-06-23 PROCEDURE — 3074F SYST BP LT 130 MM HG: CPT | Performed by: NURSE PRACTITIONER

## 2025-06-23 RX ORDER — DICYCLOMINE HCL 20 MG
20 TABLET ORAL EVERY 6 HOURS
Qty: 120 TABLET | Refills: 3 | Status: SHIPPED | OUTPATIENT
Start: 2025-06-23

## 2025-06-23 RX ORDER — COLESTIPOL HYDROCHLORIDE 1 G/1
1 TABLET ORAL 2 TIMES DAILY
Qty: 180 TABLET | Refills: 3 | Status: SHIPPED | OUTPATIENT
Start: 2025-06-23

## 2025-06-23 NOTE — PROGRESS NOTES
Chief Complaint   EPI    History of Present Illness       Rosaura Nelson is a 69 y.o. female who presents to [facility] for follow-up         History of Present Illness  The patient is a 69-year-old female who follows up today for an office visit. She is new to me today. She was last seen in the office by nurse practitioner Virgen Ortiz on 05/21/2024. She has a history of IBS with diarrhea and steatorrhea, and a history of cholecystectomy. Her last EGD and colonoscopy were performed by Dr. Carroll on 07/11/2019, revealing mild diverticulosis and nonbloody internal hemorrhoids, with an otherwise completely normal colonoscopy.    She reports that her IBS symptoms have been more manageable since starting medication for EPI, with only occasional flare-ups and no accidents. She maintains a routine of morning coffee, light breakfast, and medication intake, which has resulted in regular bowel movements. She is currently on a regimen of colestipol twice daily, dicyclomine twice daily, and omeprazole as needed. She was previously prescribed Zenpep but has since discontinued its use.    In 03/2025, she underwent an esophageal examination due to difficulty swallowing certain foods such as chicken and potatoes. She was prescribed medication by Dr. Meehan, which she takes as needed. She also had her esophagus stretched during this period.    She recalls having a few small polyps removed during a colonoscopy in 2019, which were not cancerous.    PAST SURGICAL HISTORY:  Cholecystectomy    SOCIAL HISTORY  Diet: Eats a little something in the morning.  Coffee/Tea/Caffeine-containing Drinks: Drinks coffee in the morning.    FAMILY HISTORY  - Negative for colon cancer, rectal cancer, stomach cancer, throat cancer, liver cancer, and pancreatic cancer.        Results       Result Review :       CMP          3/11/2025    11:40 3/18/2025    04:23   CMP   Glucose 104  121    BUN 15  15    Creatinine 0.72   "0.74    EGFR 90.6  87.7    Sodium 140  136    Potassium 4.4  5.2    Chloride 106  102    Calcium 9.1  8.3    Total Protein 6.7     Albumin 4.1     Globulin 2.6     Total Bilirubin 0.3     Alkaline Phosphatase 80     AST (SGOT) 13     ALT (SGPT) 9     Albumin/Globulin Ratio 1.6     BUN/Creatinine Ratio 20.8  20.3    Anion Gap 9.2  8.2      CBC          3/11/2025    11:40 3/18/2025    04:23 3/18/2025    08:41   CBC   WBC 4.89  10.44     RBC 3.79  2.90     Hemoglobin 12.6  9.8  9.2    Hematocrit 36.8  28.5  27.2    MCV 97.1  98.3     MCH 33.2  33.8     MCHC 34.2  34.4     RDW 12.8  12.5     Platelets 239  227       CBC w/diff          3/11/2025    11:40 3/18/2025    04:23 3/18/2025    08:41   CBC w/Diff   WBC 4.89  10.44     RBC 3.79  2.90     Hemoglobin 12.6  9.8  9.2    Hematocrit 36.8  28.5  27.2    MCV 97.1  98.3     MCH 33.2  33.8     MCHC 34.2  34.4     RDW 12.8  12.5     Platelets 239  227     Neutrophil Rel % 63.8  78.5     Immature Granulocyte Rel % 0.4  0.6     Lymphocyte Rel % 26.4  11.9     Monocyte Rel % 8.0  8.7     Eosinophil Rel % 0.2  0.0     Basophil Rel % 1.2  0.3           Electrolytes          3/11/2025    11:40 3/18/2025    04:23   Electrolytes   Sodium 140  136    Potassium 4.4  5.2    Chloride 106  102    Calcium 9.1  8.3      Renal Profile          3/11/2025    11:40 3/18/2025    04:23   Renal Profile   BUN 15  15    Creatinine 0.72  0.74      BMP          3/11/2025    11:40 3/18/2025    04:23   BMP   BUN 15  15    Creatinine 0.72  0.74    Sodium 140  136    Potassium 4.4  5.2    Chloride 106  102    CO2 24.8  25.8    Calcium 9.1  8.3        Lipase No results found for: \"LIPASE\"  Amylase No results found for: \"AMYLASE\"  Iron Profile   Iron   Date Value Ref Range Status   03/18/2025 27 (L) 37 - 145 mcg/dL Final     TIBC   Date Value Ref Range Status   03/18/2025 295 (L) 298 - 536 mcg/dL Final     Iron Saturation (TSAT)   Date Value Ref Range Status   03/18/2025 9 (L) 20 - 50 % Final " "    Transferrin   Date Value Ref Range Status   03/18/2025 198 (L) 200 - 360 mg/dL Final     Ferritin   Ferritin   Date Value Ref Range Status   03/18/2025 241.80 (H) 13.00 - 150.00 ng/mL Final     ESR (Sed Rate) No results found for: \"SEDRATE\"  CRP (C-Reactive) No results found for: \"CRP\"  Liver Workup   Ferritin   Date Value Ref Range Status   03/18/2025 241.80 (H) 13.00 - 150.00 ng/mL Final     Iron   Date Value Ref Range Status   03/18/2025 27 (L) 37 - 145 mcg/dL Final     TIBC   Date Value Ref Range Status   03/18/2025 295 (L) 298 - 536 mcg/dL Final     Iron Saturation (TSAT)   Date Value Ref Range Status   03/18/2025 9 (L) 20 - 50 % Final     Transferrin   Date Value Ref Range Status   03/18/2025 198 (L) 200 - 360 mg/dL Final     Acute HEP Panel No results found for: \"HEPBSAG\", \"HEPAIGM\", \"HEPBIGMCORE\", \"HEPCVIRUSABY\"  Alpha 1 No results found for: \"AFPTM\"            Results        Past Medical History       Past Medical History:   Diagnosis Date    Arthritis     Arthritis of neck 2021    Bowel disease     Cervicalgia 10/04/2013    Cholelithiasis 2018    Gallbladder removed 2022    Diabetes     Disease of thyroid gland     Diverticulitis     Diverticulitis of colon     Essential hypertension     Fracture of ankle ?    Frozen shoulder     GERD (gastroesophageal reflux disease)     Headache     High cholesterol     Hyperlipemia     Irritable bowel syndrome     Knee swelling 2020    Lactose intolerance     Limb swelling     Neck pain     Neck strain ?    Palpitations     PT DENIES RECENT PALPITATIONS    Periarthritis of shoulder 2021    PONV (postoperative nausea and vomiting)     Seasonal allergies     Sprain and strain     Tear of meniscus of knee 2020       Past Surgical History:   Procedure Laterality Date    APPENDECTOMY      BUNIONECTOMY      CHOLECYSTECTOMY      COLONOSCOPY  2019    ENDOSCOPY  2019    HAND SURGERY  ?    HYSTERECTOMY      JOINT REPLACEMENT  2015    KNEE SURGERY Left 2015    OTHER SURGICAL " HISTORY Left 2015    KNEE REPLACED AND REVISED    SHOULDER SURGERY Bilateral 2019    Dr. Nazario repaired torn rotator cuff    TOTAL KNEE ARTHROPLASTY Right 03/17/2025    Procedure: RIGHT TOTAL KNEE ARTHROPLASTY;  Surgeon: Hanh Nazario MD;  Location: [facility] MAIN OR;  Service: Orthopedics;  Laterality: Right;    TRIGGER POINT INJECTION      TUBAL ABDOMINAL LIGATION      UPPER GASTROINTESTINAL ENDOSCOPY           Current Outpatient Medications:     baclofen (LIORESAL) 10 MG tablet, Take 1 tablet by mouth 3 (Three) Times a Day As Needed., Disp: , Rfl:     cholecalciferol (VITAMIN D3) 25 MCG (1000 UT) tablet, Take 2 tablets by mouth Daily., Disp: , Rfl:     colestipol (COLESTID) 1 g tablet, Take 1 tablet by mouth 2 (Two) Times a Day., Disp: 180 tablet, Rfl: 3    dicyclomine (BENTYL) 20 MG tablet, Take 1 tablet by mouth Every 6 (Six) Hours., Disp: 120 tablet, Rfl: 3    estradiol (ESTRACE) 0.5 MG tablet, Take 1 tablet by mouth Daily., Disp: , Rfl:     Gemtesa 75 MG tablet, Take 1 tablet by mouth Daily., Disp: , Rfl:     levothyroxine (SYNTHROID, LEVOTHROID) 75 MCG tablet, Take 1 tablet by mouth Daily., Disp: , Rfl:     losartan (COZAAR) 25 MG tablet, Take 1 tablet by mouth Daily., Disp: , Rfl:     lovastatin (MEVACOR) 20 MG tablet, Take 1 tablet by mouth Every Night., Disp: , Rfl:     meloxicam (MOBIC) 7.5 MG tablet, Take 1 tablet by mouth Daily., Disp: , Rfl:     metFORMIN ER (GLUCOPHAGE-XR) 500 MG 24 hr tablet, Take 1 tablet by mouth Daily., Disp: , Rfl:     methenamine (HIPREX) 1 g tablet, Take 1 tablet by mouth 2 (Two) Times a Day With Meals., Disp: , Rfl:     metoprolol tartrate (LOPRESSOR) 50 MG tablet, Take 1 tablet by mouth 2 (Two) Times a Day., Disp: , Rfl:     omeprazole (priLOSEC) 40 MG capsule, Take 1 capsule by mouth Daily., Disp: , Rfl:     sertraline (ZOLOFT) 25 MG tablet, Take 1 tablet by mouth Daily., Disp: , Rfl:      Allergies   Allergen Reactions    Alendronate Other (See Comments)    Lisinopril  "Anaphylaxis    Ibandronate Other (See Comments)    Latex Itching, Rash and Unknown - High Severity       Family History   Problem Relation Age of Onset    Diabetes Mother     Arthritis Mother     Osteoporosis Mother     Diabetes Father     Arthritis Father     Arthritis Sister     Osteoporosis Sister     Diabetes Brother     Diabetes Daughter     Diabetes Other     Colon cancer Neg Hx     Malig Hyperthermia Neg Hx         Social History     Social History Narrative    Not on file       Objective       Review of Systems   Constitutional:  Negative for appetite change, fatigue, fever, unexpected weight gain and unexpected weight loss.   HENT:  Negative for trouble swallowing.    Respiratory:  Negative for cough, choking, chest tightness, shortness of breath, wheezing and stridor.    Cardiovascular:  Negative for chest pain, palpitations and leg swelling.   Gastrointestinal:  Negative for abdominal distention, abdominal pain, anal bleeding, blood in stool, constipation, diarrhea, nausea, rectal pain, vomiting, GERD and indigestion.        Vital Signs:   /69 (BP Location: Right arm, Patient Position: Sitting, Cuff Size: Adult)   Pulse 61   Ht 172.7 cm (68\")   Wt 65 kg (143 lb 3.2 oz)   BMI 21.77 kg/m²       Physical Exam  Constitutional:       General: She is not in acute distress.     Appearance: She is well-developed. She is not ill-appearing.   HENT:      Head: Normocephalic.   Eyes:      Pupils: Pupils are equal, round, and reactive to light.   Cardiovascular:      Rate and Rhythm: Normal rate and regular rhythm.      Heart sounds: Normal heart sounds.   Pulmonary:      Effort: Pulmonary effort is normal.      Breath sounds: Normal breath sounds.   Abdominal:      General: Bowel sounds are normal. There is no distension.      Palpations: Abdomen is soft. There is no mass.      Tenderness: There is no abdominal tenderness. There is no guarding or rebound.      Hernia: No hernia is present. "   Musculoskeletal:         General: Normal range of motion.   Skin:     General: Skin is warm and dry.   Neurological:      Mental Status: She is alert and oriented to person, place, and time.   Psychiatric:         Speech: Speech normal.         Behavior: Behavior normal.         Judgment: Judgment normal.         Physical Exam          Assessment & Plan          Assessment and Plan    Diagnoses and all orders for this visit:    1. Irritable bowel syndrome with diarrhea (Primary)  -     colestipol (COLESTID) 1 g tablet; Take 1 tablet by mouth 2 (Two) Times a Day.  Dispense: 180 tablet; Refill: 3  -     dicyclomine (BENTYL) 20 MG tablet; Take 1 tablet by mouth Every 6 (Six) Hours.  Dispense: 120 tablet; Refill: 3    2. Pancreatic insufficiency    3. Gastroesophageal reflux disease, unspecified whether esophagitis present    4. Dysphagia, unspecified type        Assessment & Plan  1. Irritable Bowel Syndrome (IBS):  - Colestipol 1 g twice daily.  - Dicyclomine 20 mg twice daily.  - Refills provided for colestipol and dicyclomine for 90 days, sufficient for a year.  - Contact office if experiencing flare-ups.    2. Exocrine Pancreatic Insufficiency (EPI):  - Continue current medications as prescribed.  - Zenpep discontinued.    3.  Dysphagia/GERD:  - Esophageal dilation performed in 03/2025 in [place].  - Omeprazole 40 mg prescribed by Dr. Meehan taken as needed.  - Improvement in swallowing symptoms reported.    Follow-up: 06/2026.          Follow Up       Follow Up   Return in about 1 year (around 6/23/2026) for DIARRHEA, GERD.  Patient was given instructions and counseling regarding her condition or for health maintenance advice. Please see specific information pulled into the AVS if appropriate.       Patient or patient representative verbalized consent for the use of Ambient Listening during the visit with  ISRRAEL Allison for chart documentation. 6/23/2025  14:59 EDT

## 2025-07-15 ENCOUNTER — OFFICE VISIT (OUTPATIENT)
Dept: ORTHOPEDIC SURGERY | Facility: CLINIC | Age: 70
End: 2025-07-15
Payer: MEDICARE

## 2025-07-15 VITALS
DIASTOLIC BLOOD PRESSURE: 76 MMHG | SYSTOLIC BLOOD PRESSURE: 115 MMHG | HEIGHT: 68 IN | HEART RATE: 68 BPM | WEIGHT: 143 LBS | BODY MASS INDEX: 21.67 KG/M2 | OXYGEN SATURATION: 95 %

## 2025-07-15 DIAGNOSIS — Z96.651 S/P TOTAL KNEE ARTHROPLASTY, RIGHT: Primary | ICD-10-CM

## 2025-07-15 PROCEDURE — 3078F DIAST BP <80 MM HG: CPT | Performed by: ORTHOPAEDIC SURGERY

## 2025-07-15 PROCEDURE — 3074F SYST BP LT 130 MM HG: CPT | Performed by: ORTHOPAEDIC SURGERY

## 2025-07-15 PROCEDURE — 99213 OFFICE O/P EST LOW 20 MIN: CPT | Performed by: ORTHOPAEDIC SURGERY

## 2025-07-15 NOTE — PROGRESS NOTES
"Chief Complaint  Follow-up of the Right Knee       Subjective      Rosaura Nelson presents to [facility] for a follow up for her right knee. She underwent a right total knee arthroplasty performed on 03/17/25. She presents to the office today for her four month post-operative appointment. She just completed outpatient physical therapy. She is overall doing well and denies any pain. She states her range of motion has improved.      Allergies   Allergen Reactions    Alendronate Other (See Comments)    Lisinopril Anaphylaxis    Ibandronate Other (See Comments)    Latex Itching, Rash and Unknown - High Severity        Social History     Socioeconomic History    Marital status:    Tobacco Use    Smoking status: Never     Passive exposure: Past    Smokeless tobacco: Never   Vaping Use    Vaping status: Never Used   Substance and Sexual Activity    Alcohol use: No    Drug use: Never    Sexual activity: Not Currently     Partners: Male     Birth control/protection: None, Tubal ligation, Hysterectomy        I reviewed the patient's chief complaint, history of present illness, review of systems, past medical history, surgical history, family history, social history, medications, and allergy list.     Review of Systems     Constitutional: Denies fevers, chills, weight loss  Cardiovascular: Denies chest pain, shortness of breath  Skin: Denies rashes, acute skin changes  Neurologic: Denies headache, loss of consciousness  MSK: right knee pain       Vital Signs:   /76   Pulse 68   Ht 172.7 cm (68\")   Wt 64.9 kg (143 lb)   SpO2 95%   BMI 21.74 kg/m²          Physical Exam  General: Alert. No acute distress    Ortho Exam        Right lower extremity: well healed surgicial incision, -3 degrees extension, flexion  to 115 degrees, stable to varus/valgus stress, ambulates with an antalgic gait, non tender to the medial joint line  and lateral joint line, calf soft, distal " neurovascularly intact, positive  pulses, positive EHL, FHL, GS, and TA. Sensation intact to all 5 nerves of the foot.        Procedures        Imaging Results (Most Recent)       None             Result Review :       No results found.           Assessment and Plan     Diagnoses and all orders for this visit:    1. S/P total knee arthroplasty, right (Primary)        The patient presents here today for a follow up for her right total knee arthroplasty.      She will continue home exercises and over the counter medications for pain control.      Will obtain X-Rays of right knee at next visit.    Call or return if worsening symptoms.    Follow Up     6 months     Patient was given instructions and counseling regarding her condition or for health maintenance advice. Please see specific information pulled into the AVS if appropriate.     Scribed for Hanh Nazario MD by Rose Aparicio.  07/15/25   15:08 EDT      I have personally performed the services described in this document as scribed by the above individual and it is both accurate and complete. Hanh Nazario MD 07/15/25

## (undated) DEVICE — PLASMABLADE PS200-040 4.0: Brand: PLASMABLADE™

## (undated) DEVICE — STRYKER PERFORMANCE SERIES SAGITTAL BLADE: Brand: STRYKER PERFORMANCE SERIES

## (undated) DEVICE — MAT FLR ABS W/BLU/LINER 56X72IN WHT

## (undated) DEVICE — CVR LEG BOOTLEG F/R NOSKID 33IN

## (undated) DEVICE — TOTAL KNEE-LF: Brand: MEDLINE INDUSTRIES, INC.

## (undated) DEVICE — NEEDLE,18GX1.5",REG,BEVEL: Brand: MEDLINE

## (undated) DEVICE — SOL IRR NACL 0.9PCT 3000ML

## (undated) DEVICE — APPL CHLORAPREP HI/LITE 26ML ORNG

## (undated) DEVICE — GLOVE,SURG,SENSICARE SLT,LF,PF,6.5: Brand: MEDLINE

## (undated) DEVICE — DISPOSABLE TOURNIQUET CUFF 30"X4", 1-LINE, WHITE, STERILE, 1EA/PK, 10PK/CS: Brand: ASP MEDICAL

## (undated) DEVICE — GAUZE,SPONGE,4"X4",16PLY,STRL,LF,10/TRAY: Brand: MEDLINE

## (undated) DEVICE — NO-SCRATCH ™ SMALL WHITNEY CURETTE ™ IS A SINGLE-USE, PLASTIC CURETTE FOR QUICKLY APPLYING, MANIPULATING AND REMOVING BONE CEMENT DURING HIP AND KNEE REPLACEMENT SURGERY. THE PLASTIC IS SOFTER THAN STEEL INSTRUMENTS, REDUCING THE RISK OF DAMAGING THE PROSTHESIS WITH METAL INSTRUMENTS.  THE CURETTE’S 6MM TIP REMOVES EXCESS CEMENT FROM REPLACEMENT HIPS AND KNEES. EASY-TO-MANEUVER, THE SMALL BLUE CURETTE LETS YOU REMOVE CEMENT FROM ALL EDGES OF THE PROSTHESIS.NO-SCRATCH WHITNEY SMALL CURETTE FEATURES:SAFER THAN STEEL- MADE OF PLASTIC - STURDY YET SOFTER THAN SURGICAL STEEL.HANDIER- EACH TOOL HAS A MOLDED-IN THUMB INDENTATION INSTANTLY ORIENTING THE TOOL.- EASIER TO MANEUVER IN HARD TO SEE PLACES.- COLOR-CODED FOR EASY IDENTIFICATION.FASTER- COMES INDIVIDUALLY PACKAGED IN STERILE, PEEL OPEN POUCH, READY TO GO.- APPLIES, MANIPULATES, OR REMOVES CEMENT WITH FINGERTIP PRECISION.ECONOMICAL- THE COST OF A SINGLE REVISION DWARFS THE COST OF A SINGLE-USE CURETTE. - DISPOSABLE – THERE’S NO NEED TO WASTE TIME REMOVING HARDENED CEMENT OR RE-STERILIZING TOOLS.- LESS EXPENSIVE TO BUY AND INVENTORY - ORDER ONLY THE TOOL YOU USE.- PACKAGED 25 INDIVIDUALLY WRAPPED TOOLS TO A CARTON FOR CONVENIENT SHELF STORAGE.: Brand: WHITNEY NO-SCRATCH CURETTE (SMALL)

## (undated) DEVICE — INTENDED FOR TISSUE SEPARATION, AND OTHER PROCEDURES THAT REQUIRE A SHARP SURGICAL BLADE TO PUNCTURE OR CUT.: Brand: BARD-PARKER ® CARBON RIB-BACK BLADES

## (undated) DEVICE — UNDERCAST PADDING: Brand: DEROYAL

## (undated) DEVICE — TOWEL,OR,DSP,ST,BLUE,STD,4/PK,20PK/CS: Brand: MEDLINE

## (undated) DEVICE — BNDG ELAS MATRX V/CLS 6INX10YD LF

## (undated) DEVICE — THE STERILE LIGHT HANDLE COVER IS USED WITH STERIS SURGICAL LIGHTING AND VISUALIZATION SYSTEMS.

## (undated) DEVICE — HANDPIECE SET WITH HIGH FLOW TIP AND SUCTION TUBE: Brand: INTERPULSE

## (undated) DEVICE — SLV SCD KN/LEN ADJ EXPRSS BLENDED MD 1P/U

## (undated) DEVICE — GLOVE,SURG,SENSICARE SLT,LF,PF,7: Brand: MEDLINE

## (undated) DEVICE — 3 BONE CEMENT MIXER: Brand: MIXEVAC

## (undated) DEVICE — SUT VIC 2/0 CT1 36IN

## (undated) DEVICE — PROXIMATE RH ROTATING HEAD SKIN STAPLERS (35 WIDE) CONTAINS 35 STAINLESS STEEL STAPLES: Brand: PROXIMATE

## (undated) DEVICE — SYR LUERLOK 30CC

## (undated) DEVICE — ANTIBACTERIAL VIOLET BRAIDED (POLYGLACTIN 910), SYNTHETIC ABSORBABLE SURGICAL SUTURE: Brand: COATED VICRYL

## (undated) DEVICE — STERILE POLYISOPRENE POWDER-FREE SURGICAL GLOVES WITH EMOLLIENT COATING: Brand: PROTEXIS

## (undated) DEVICE — DRAPE,TOWEL,LARGE,INVISISHIELD: Brand: MEDLINE

## (undated) DEVICE — GLOVE,SURG,SENSICARE SLT,LF,PF,8.5: Brand: MEDLINE

## (undated) DEVICE — GLV SURG BIOGEL LTX PF 8 1/2

## (undated) DEVICE — PULLOVER TOGA, 2X LARGE: Brand: FLYTE, SURGICOOL

## (undated) DEVICE — BASIC SINGLE BASIN-LF: Brand: MEDLINE INDUSTRIES, INC.